# Patient Record
Sex: MALE | Race: WHITE | NOT HISPANIC OR LATINO | Employment: FULL TIME | URBAN - METROPOLITAN AREA
[De-identification: names, ages, dates, MRNs, and addresses within clinical notes are randomized per-mention and may not be internally consistent; named-entity substitution may affect disease eponyms.]

---

## 2019-02-26 PROBLEM — D03.9 LENTIGO MALIGNA: Status: ACTIVE | Noted: 2019-02-26

## 2019-03-20 ENCOUNTER — OFFICE VISIT (OUTPATIENT)
Dept: CARDIOLOGY | Facility: CLINIC | Age: 69
End: 2019-03-20
Payer: MEDICARE

## 2019-03-20 VITALS
BODY MASS INDEX: 31.26 KG/M2 | HEART RATE: 64 BPM | HEIGHT: 72 IN | SYSTOLIC BLOOD PRESSURE: 118 MMHG | WEIGHT: 230.81 LBS | DIASTOLIC BLOOD PRESSURE: 70 MMHG

## 2019-03-20 DIAGNOSIS — I65.23 BILATERAL CAROTID ARTERY STENOSIS: Chronic | ICD-10-CM

## 2019-03-20 DIAGNOSIS — E78.00 PURE HYPERCHOLESTEROLEMIA: ICD-10-CM

## 2019-03-20 DIAGNOSIS — I25.118 CORONARY ARTERY DISEASE OF NATIVE ARTERY OF NATIVE HEART WITH STABLE ANGINA PECTORIS: Primary | ICD-10-CM

## 2019-03-20 DIAGNOSIS — I10 ESSENTIAL HYPERTENSION: ICD-10-CM

## 2019-03-20 PROBLEM — I25.10 CORONARY ARTERY DISEASE INVOLVING NATIVE CORONARY ARTERY OF NATIVE HEART WITHOUT ANGINA PECTORIS: Chronic | Status: ACTIVE | Noted: 2019-03-20

## 2019-03-20 PROCEDURE — 99203 OFFICE O/P NEW LOW 30 MIN: CPT | Mod: S$GLB,,, | Performed by: INTERNAL MEDICINE

## 2019-03-20 PROCEDURE — 99203 PR OFFICE/OUTPT VISIT, NEW, LEVL III, 30-44 MIN: ICD-10-PCS | Mod: S$GLB,,, | Performed by: INTERNAL MEDICINE

## 2019-03-20 PROCEDURE — 99999 PR PBB SHADOW E&M-EST. PATIENT-LVL III: ICD-10-PCS | Mod: PBBFAC,,, | Performed by: INTERNAL MEDICINE

## 2019-03-20 PROCEDURE — 3074F PR MOST RECENT SYSTOLIC BLOOD PRESSURE < 130 MM HG: ICD-10-PCS | Mod: CPTII,S$GLB,, | Performed by: INTERNAL MEDICINE

## 2019-03-20 PROCEDURE — 99999 PR PBB SHADOW E&M-EST. PATIENT-LVL III: CPT | Mod: PBBFAC,,, | Performed by: INTERNAL MEDICINE

## 2019-03-20 PROCEDURE — 3078F PR MOST RECENT DIASTOLIC BLOOD PRESSURE < 80 MM HG: ICD-10-PCS | Mod: CPTII,S$GLB,, | Performed by: INTERNAL MEDICINE

## 2019-03-20 PROCEDURE — 3078F DIAST BP <80 MM HG: CPT | Mod: CPTII,S$GLB,, | Performed by: INTERNAL MEDICINE

## 2019-03-20 PROCEDURE — 3074F SYST BP LT 130 MM HG: CPT | Mod: CPTII,S$GLB,, | Performed by: INTERNAL MEDICINE

## 2019-03-20 RX ORDER — ROSUVASTATIN CALCIUM 10 MG/1
10 TABLET, COATED ORAL DAILY
Qty: 30 TABLET | Refills: 5 | Status: SHIPPED | OUTPATIENT
Start: 2019-03-20 | End: 2019-08-26 | Stop reason: SDUPTHER

## 2019-03-20 NOTE — PROGRESS NOTES
"Subjective:    Patient ID:  Jeffrey Schoen is a 68 y.o. male who presents for follow-up of Coronary Artery Disease; Hypertension; and Hyperlipidemia      HPI     Jeffrey Schoen returns for follow-up(its been 9 years since last seen) and is feeling well today.  He states he has gained weight over the years.  He states he takes a morning walk with his wife for 2 miles 3-5x/week.  He noticed a few episodes of substernal chest "pressure" with walking sometimes associated with dyspnea that is relived with rest.  He states it started in the last 3-6 months, but has not happened in the last 2 months at all despite walking as before.  Denies palpitations, dizziness or syncope.      Review of Systems   Constitution: Positive for weight gain. Negative for decreased appetite, diaphoresis, fever, weakness, malaise/fatigue and weight loss.   Eyes: Negative for visual disturbance.   Cardiovascular: Negative for chest pain, claudication, dyspnea on exertion, irregular heartbeat, near-syncope, orthopnea and palpitations.   Respiratory: Negative for cough, shortness of breath and wheezing.    Skin: Negative for rash.   Musculoskeletal: Negative for muscle cramps and myalgias.   Gastrointestinal: Negative for anorexia, heartburn and melena.   Neurological: Negative for dizziness and light-headedness.   Psychiatric/Behavioral: Negative for altered mental status.        Objective:    Physical Exam   Constitutional: He is oriented to person, place, and time. He appears well-developed and well-nourished.   /70   Pulse 64   Ht 6' (1.829 m)   Wt 104.7 kg (230 lb 13.2 oz)   BMI 31.31 kg/m²    HENT:   Head: Normocephalic and atraumatic.   Eyes: EOM are normal. Pupils are equal, round, and reactive to light.   Neck: Normal range of motion. Neck supple. No thyromegaly present.   Cardiovascular: Normal rate, regular rhythm, normal heart sounds and intact distal pulses. Exam reveals no gallop and no friction rub.   No murmur " heard.  Pulmonary/Chest: Effort normal and breath sounds normal. No respiratory distress. He has no wheezes. He has no rales.   Abdominal: Soft. Bowel sounds are normal. He exhibits no distension and no mass. There is no tenderness.   Musculoskeletal: Normal range of motion. He exhibits no edema.   Neurological: He is alert and oriented to person, place, and time. He has normal reflexes. Coordination normal.   Skin: Skin is warm and dry. No rash noted.   Psychiatric: He has a normal mood and affect. His behavior is normal.         Assessment:       1. Coronary artery disease of native artery of native heart with stable angina pectoris    2. Essential hypertension    3. Pure hypercholesterolemia    4. Bilateral carotid artery stenosis         Plan:       PET stress  CFD echo, carotid US  Change to Crestor 10 mg (d/c pravachol)  Lipids, Chem-20 in 2 months  Limit heavy activities  Weight loss; Med. Diet

## 2019-04-09 ENCOUNTER — TELEPHONE (OUTPATIENT)
Dept: CARDIOLOGY | Facility: CLINIC | Age: 69
End: 2019-04-09

## 2019-04-10 ENCOUNTER — CLINICAL SUPPORT (OUTPATIENT)
Dept: CARDIOLOGY | Facility: CLINIC | Age: 69
End: 2019-04-10
Attending: INTERNAL MEDICINE
Payer: MEDICARE

## 2019-04-10 ENCOUNTER — HOSPITAL ENCOUNTER (OUTPATIENT)
Dept: CARDIOLOGY | Facility: CLINIC | Age: 69
Discharge: HOME | End: 2019-04-10
Attending: INTERNAL MEDICINE
Payer: MEDICARE

## 2019-04-10 VITALS
HEART RATE: 70 BPM | HEIGHT: 72 IN | BODY MASS INDEX: 31.15 KG/M2 | SYSTOLIC BLOOD PRESSURE: 118 MMHG | DIASTOLIC BLOOD PRESSURE: 70 MMHG | WEIGHT: 230 LBS

## 2019-04-10 DIAGNOSIS — I25.118 CORONARY ARTERY DISEASE OF NATIVE ARTERY OF NATIVE HEART WITH STABLE ANGINA PECTORIS: ICD-10-CM

## 2019-04-10 DIAGNOSIS — I65.23 BILATERAL CAROTID ARTERY STENOSIS: ICD-10-CM

## 2019-04-10 LAB
ASCENDING AORTA: 3.95 CM
AV INDEX (PROSTH): 0.67
AV MEAN GRADIENT: 2.54 MMHG
AV PEAK GRADIENT: 4.58 MMHG
AV VALVE AREA: 2.87 CM2
AV VELOCITY RATIO: 0.56
BSA FOR ECHO PROCEDURE: 2.3 M2
CV ECHO LV RWT: 0.32 CM
CV STRESS BASE HR: 55 BPM
DIASTOLIC BLOOD PRESSURE: 77 MMHG
DOP CALC AO PEAK VEL: 1.07 M/S
DOP CALC AO VTI: 22.3 CM
DOP CALC LVOT AREA: 4.3 CM2
DOP CALC LVOT DIAMETER: 2.34 CM
DOP CALC LVOT PEAK VEL: 0.6 M/S
DOP CALC LVOT STROKE VOLUME: 64 CM3
DOP CALCLVOT PEAK VEL VTI: 14.89 CM
E WAVE DECELERATION TIME: 277.27 MSEC
E/A RATIO: 0.75
E/E' RATIO: 5.54
ECHO LV POSTERIOR WALL: 0.84 CM (ref 0.6–1.1)
END DIASTOLIC INDEX-HIGH: 170 ML/M2
END SYSTOLIC INDEX-HIGH: 70 ML/M2
FRACTIONAL SHORTENING: 27 % (ref 28–44)
INTERVENTRICULAR SEPTUM: 0.94 CM (ref 0.6–1.1)
LA MAJOR: 5.56 CM
LA MINOR: 5.2 CM
LA WIDTH: 3.72 CM
LEFT ATRIUM SIZE: 4 CM
LEFT ATRIUM VOLUME INDEX: 30.1 ML/M2
LEFT ATRIUM VOLUME: 67.97 CM3
LEFT CBA DIAS: 17 CM/S
LEFT CBA SYS: 65 CM/S
LEFT CCA DIST DIAS: 21 CM/S
LEFT CCA DIST SYS: 78 CM/S
LEFT CCA MID DIAS: 23 CM/S
LEFT CCA MID SYS: 100 CM/S
LEFT CCA PROX DIAS: 20 CM/S
LEFT CCA PROX SYS: 111 CM/S
LEFT ECA DIAS: 10 CM/S
LEFT ECA SYS: 85 CM/S
LEFT ICA DIST DIAS: 19 CM/S
LEFT ICA DIST SYS: 57 CM/S
LEFT ICA MID DIAS: 19 CM/S
LEFT ICA MID SYS: 59 CM/S
LEFT ICA PROX DIAS: 16 CM/S
LEFT ICA PROX SYS: 48 CM/S
LEFT INTERNAL DIMENSION IN SYSTOLE: 3.89 CM (ref 2.1–4)
LEFT VENTRICLE DIASTOLIC VOLUME INDEX: 60.26 ML/M2
LEFT VENTRICLE DIASTOLIC VOLUME: 136.24 ML
LEFT VENTRICLE MASS INDEX: 76.6 G/M2
LEFT VENTRICLE SYSTOLIC VOLUME INDEX: 29 ML/M2
LEFT VENTRICLE SYSTOLIC VOLUME: 65.51 ML
LEFT VENTRICULAR INTERNAL DIMENSION IN DIASTOLE: 5.32 CM (ref 3.5–6)
LEFT VENTRICULAR MASS: 173.11 G
LEFT VERTEBRAL DIAS: 14 CM/S
LEFT VERTEBRAL SYS: 48 CM/S
LV LATERAL E/E' RATIO: 5.14
LV SEPTAL E/E' RATIO: 6
MV PEAK A VEL: 0.48 M/S
MV PEAK E VEL: 0.36 M/S
NUC REST DIASTOLIC VOLUME INDEX: 73
NUC REST EJECTION FRACTION: 57
NUC REST SYSTOLIC VOLUME INDEX: 31
NUC STRESS DIASTOLIC VOLUME INDEX: 65
NUC STRESS EJECTION FRACTION: 53 %
NUC STRESS SYSTOLIC VOLUME INDEX: 30
OHS CV CAROTID RIGHT ICA EDV HIGHEST: 30
OHS CV CAROTID ULTRASOUND LEFT ICA/CCA RATIO: 0.53
OHS CV CAROTID ULTRASOUND RIGHT ICA/CCA RATIO: 0.87
OHS CV CPX 1 MINUTE RECOVERY HEART RATE: 98 BPM
OHS CV CPX 85 PERCENT MAX PREDICTED HEART RATE MALE: 131
OHS CV CPX MAX PREDICTED HEART RATE: 154
OHS CV CPX PATIENT IS FEMALE: 0
OHS CV CPX PATIENT IS MALE: 1
OHS CV CPX PEAK DIASTOLIC BLOOD PRESSURE: 62 MMHG
OHS CV CPX PEAK HEAR RATE: 63 BPM
OHS CV CPX PEAK RATE PRESSURE PRODUCT: 6552
OHS CV CPX PEAK SYSTOLIC BLOOD PRESSURE: 104 MMHG
OHS CV CPX PERCENT MAX PREDICTED HEART RATE ACHIEVED: 41
OHS CV CPX RATE PRESSURE PRODUCT PRESENTING: 6160
OHS CV PV CAROTID LEFT HIGHEST CCA: 111
OHS CV PV CAROTID LEFT HIGHEST ICA: 59
OHS CV PV CAROTID RIGHT HIGHEST CCA: 83
OHS CV PV CAROTID RIGHT HIGHEST ICA: 72
OHS CV US CAROTID LEFT HIGHEST EDV: 19
PISA TR MAX VEL: 2.32 M/S
PULM VEIN S/D RATIO: 0.91
PV PEAK D VEL: 0.47 M/S
PV PEAK S VEL: 0.43 M/S
RA MAJOR: 5.24 CM
RA PRESSURE: 3 MMHG
RA WIDTH: 4.86 CM
RETIRED EF AND QEF - SEE NOTES: 51 %
RIGHT ARM DIASTOLIC BLOOD PRESSURE: 70 MMHG
RIGHT ARM SYSTOLIC BLOOD PRESSURE: 118 MMHG
RIGHT CBA DIAS: 17 CM/S
RIGHT CBA SYS: 57 CM/S
RIGHT CCA DIST DIAS: 21 CM/S
RIGHT CCA DIST SYS: 77 CM/S
RIGHT CCA MID DIAS: 16 CM/S
RIGHT CCA MID SYS: 65 CM/S
RIGHT CCA PROX DIAS: 15 CM/S
RIGHT CCA PROX SYS: 83 CM/S
RIGHT ECA DIAS: 12 CM/S
RIGHT ECA SYS: 90 CM/S
RIGHT ICA DIST DIAS: 30 CM/S
RIGHT ICA DIST SYS: 72 CM/S
RIGHT ICA MID DIAS: 25 CM/S
RIGHT ICA MID SYS: 71 CM/S
RIGHT ICA PROX DIAS: 19 CM/S
RIGHT ICA PROX SYS: 55 CM/S
RIGHT VENTRICULAR END-DIASTOLIC DIMENSION: 4.71 CM
RIGHT VERTEBRAL DIAS: 12 CM/S
RIGHT VERTEBRAL SYS: 45 CM/S
RV TISSUE DOPPLER FREE WALL SYSTOLIC VELOCITY 1 (APICAL 4 CHAMBER VIEW): 13.37 M/S
SINUS: 4.14 CM
STJ: 3.72 CM
SYSTOLIC BLOOD PRESSURE: 112 MMHG
TDI LATERAL: 0.07
TDI SEPTAL: 0.06
TDI: 0.07
TR MAX PG: 21.53 MMHG
TRICUSPID ANNULAR PLANE SYSTOLIC EXCURSION: 2.74 CM
TV REST PULMONARY ARTERY PRESSURE: 25 MMHG

## 2019-04-10 PROCEDURE — A9555 RB82 RUBIDIUM: HCPCS | Mod: S$GLB,,, | Performed by: INTERNAL MEDICINE

## 2019-04-10 PROCEDURE — 93015 PET STRESS (CUPID ONLY): ICD-10-PCS | Mod: S$GLB,,, | Performed by: INTERNAL MEDICINE

## 2019-04-10 PROCEDURE — A9555 PET STRESS (CUPID ONLY): ICD-10-PCS | Mod: S$GLB,,, | Performed by: INTERNAL MEDICINE

## 2019-04-10 PROCEDURE — 93306 TTE W/DOPPLER COMPLETE: CPT | Mod: S$GLB,,, | Performed by: INTERNAL MEDICINE

## 2019-04-10 PROCEDURE — 93306 TRANSTHORACIC ECHO (TTE) COMPLETE (CUPID ONLY): ICD-10-PCS | Mod: S$GLB,,, | Performed by: INTERNAL MEDICINE

## 2019-04-10 PROCEDURE — 93880 EXTRACRANIAL BILAT STUDY: CPT | Mod: S$GLB,,, | Performed by: INTERNAL MEDICINE

## 2019-04-10 PROCEDURE — 99999 PR PBB SHADOW E&M-EST. PATIENT-LVL I: ICD-10-PCS | Mod: PBBFAC,,,

## 2019-04-10 PROCEDURE — 93880 CV US DOPPLER CAROTID (CUPID ONLY): ICD-10-PCS | Mod: S$GLB,,, | Performed by: INTERNAL MEDICINE

## 2019-04-10 PROCEDURE — 93015 CV STRESS TEST SUPVJ I&R: CPT | Mod: S$GLB,,, | Performed by: INTERNAL MEDICINE

## 2019-04-10 PROCEDURE — 99999 PR PBB SHADOW E&M-EST. PATIENT-LVL I: CPT | Mod: PBBFAC,,,

## 2019-04-10 PROCEDURE — 78492 PET STRESS (CUPID ONLY): ICD-10-PCS | Mod: S$GLB,,, | Performed by: INTERNAL MEDICINE

## 2019-04-10 PROCEDURE — 78492 MYOCRD IMG PET MLT RST&STRS: CPT | Mod: S$GLB,,, | Performed by: INTERNAL MEDICINE

## 2019-04-10 RX ORDER — DIPYRIDAMOLE 5 MG/ML
40.73 INJECTION INTRAVENOUS
Status: COMPLETED | OUTPATIENT
Start: 2019-04-10 | End: 2019-04-10

## 2019-04-10 RX ADMIN — DIPYRIDAMOLE 40.75 MG: 5 INJECTION INTRAVENOUS at 08:04

## 2019-04-10 NOTE — NURSING NOTE
Patient identified via spelling of name and date of birth. Arrived ambulatory to clinic lobby with wife in attendance. NPO status verified. No caffeine intake verified. Instructions given. Verbalizes understanding. Consent signed.

## 2019-04-11 ENCOUNTER — TELEPHONE (OUTPATIENT)
Dept: CARDIOLOGY | Facility: CLINIC | Age: 69
End: 2019-04-11

## 2019-04-11 NOTE — TELEPHONE ENCOUNTER
Returned patient's call. He reviewed test results on [facility] and is satisfied. He has no questions.     Freya Garcia Staff   Caller: Pt called (Today,  8:22 AM)             Pt calling to get results for Echo,and US. Please call pt @ [phone]. Thank you.

## 2019-04-11 NOTE — TELEPHONE ENCOUNTER
----- Message from Freya Wheatley sent at 4/11/2019  8:22 AM CDT -----  Contact: Pt called   Pt calling to get results for Echo,and US. Please call pt @ [phone]. Thank you.

## 2019-05-20 ENCOUNTER — LAB VISIT (OUTPATIENT)
Dept: LAB | Facility: HOSPITAL | Age: 69
End: 2019-05-20
Attending: INTERNAL MEDICINE
Payer: MEDICARE

## 2019-05-20 ENCOUNTER — TELEPHONE (OUTPATIENT)
Dept: CARDIOLOGY | Facility: CLINIC | Age: 69
End: 2019-05-20

## 2019-05-20 ENCOUNTER — PATIENT MESSAGE (OUTPATIENT)
Dept: CARDIOLOGY | Facility: CLINIC | Age: 69
End: 2019-05-20

## 2019-05-20 DIAGNOSIS — I25.118 CORONARY ARTERY DISEASE OF NATIVE ARTERY OF NATIVE HEART WITH STABLE ANGINA PECTORIS: ICD-10-CM

## 2019-05-20 DIAGNOSIS — E78.00 PURE HYPERCHOLESTEROLEMIA: ICD-10-CM

## 2019-05-20 LAB
ALBUMIN SERPL BCP-MCNC: 3.8 G/DL (ref 3.5–5.2)
ALP SERPL-CCNC: 81 U/L (ref 55–135)
ALT SERPL W/O P-5'-P-CCNC: 26 U/L (ref 10–44)
ANION GAP SERPL CALC-SCNC: 8 MMOL/L (ref 8–16)
AST SERPL-CCNC: 27 U/L (ref 10–40)
BILIRUB SERPL-MCNC: 0.7 MG/DL (ref 0.1–1)
BUN SERPL-MCNC: 24 MG/DL (ref 8–23)
CALCIUM SERPL-MCNC: 9.5 MG/DL (ref 8.7–10.5)
CHLORIDE SERPL-SCNC: 108 MMOL/L (ref 95–110)
CHOLEST SERPL-MCNC: 134 MG/DL (ref 120–199)
CHOLEST/HDLC SERPL: 2.4 {RATIO} (ref 2–5)
CO2 SERPL-SCNC: 27 MMOL/L (ref 23–29)
CREAT SERPL-MCNC: 1.1 MG/DL (ref 0.5–1.4)
EST. GFR  (AFRICAN AMERICAN): >60 ML/MIN/1.73 M^2
EST. GFR  (NON AFRICAN AMERICAN): >60 ML/MIN/1.73 M^2
GLUCOSE SERPL-MCNC: 95 MG/DL (ref 70–110)
HDLC SERPL-MCNC: 56 MG/DL (ref 40–75)
HDLC SERPL: 41.8 % (ref 20–50)
LDLC SERPL CALC-MCNC: 65.8 MG/DL (ref 63–159)
NONHDLC SERPL-MCNC: 78 MG/DL
POTASSIUM SERPL-SCNC: 3.8 MMOL/L (ref 3.5–5.1)
PROT SERPL-MCNC: 6.8 G/DL (ref 6–8.4)
SODIUM SERPL-SCNC: 143 MMOL/L (ref 136–145)
TRIGL SERPL-MCNC: 61 MG/DL (ref 30–150)

## 2019-05-20 PROCEDURE — 80061 LIPID PANEL: CPT

## 2019-05-20 PROCEDURE — 80053 COMPREHEN METABOLIC PANEL: CPT

## 2019-05-20 PROCEDURE — 36415 COLL VENOUS BLD VENIPUNCTURE: CPT | Mod: PO

## 2019-05-20 NOTE — TELEPHONE ENCOUNTER
Results of labs done today were given to patient by phone and through [facility].    Notes recorded by Jose Kessler MD on 5/20/2019 at 4:36 PM CDT  Cholesterol profile much improved and now in very good shape (stay on the Crestor).  All other tests look good.

## 2019-05-28 ENCOUNTER — TELEPHONE (OUTPATIENT)
Dept: CARDIOLOGY | Facility: CLINIC | Age: 69
End: 2019-05-28

## 2019-05-28 NOTE — TELEPHONE ENCOUNTER
----- Message from Devika Soto sent at 5/28/2019  8:52 AM CDT -----  Contact: Pt [phone]  Khushbu please call pt to scooby an appt     Thanks

## 2019-05-28 NOTE — TELEPHONE ENCOUNTER
Returned patient's call. He asked that we put him on the list to see Dr. Kessler at the end of Aug so he can get on the routine of coming every Aug. Will call him with a date when appt is made.    .   Devika Garcia Staff   Caller: Pt [phone] (Today,  8:52 AM)             Khushbu please call pt to scooby an appt     Thanks

## 2019-08-27 RX ORDER — ROSUVASTATIN CALCIUM 10 MG/1
10 TABLET, COATED ORAL DAILY
Qty: 30 TABLET | Refills: 6 | Status: SHIPPED | OUTPATIENT
Start: 2019-08-27 | End: 2020-08-18 | Stop reason: SDUPTHER

## 2020-08-19 RX ORDER — ROSUVASTATIN CALCIUM 10 MG/1
10 TABLET, COATED ORAL DAILY
Qty: 30 TABLET | Refills: 6 | Status: SHIPPED | OUTPATIENT
Start: 2020-08-19 | End: 2020-08-31

## 2020-08-31 PROBLEM — Z79.899 ON STATIN THERAPY: Status: ACTIVE | Noted: 2020-08-31

## 2020-08-31 PROBLEM — D03.9 LENTIGO MALIGNA: Status: RESOLVED | Noted: 2019-02-26 | Resolved: 2020-08-31

## 2020-08-31 PROBLEM — Z79.82 LONG TERM (CURRENT) USE OF ASPIRIN: Status: ACTIVE | Noted: 2020-08-31

## 2020-08-31 PROBLEM — N20.0 NEPHROLITHIASIS: Status: ACTIVE | Noted: 2020-08-31

## 2020-08-31 PROBLEM — N40.0 BENIGN PROSTATIC HYPERPLASIA WITHOUT LOWER URINARY TRACT SYMPTOMS: Status: ACTIVE | Noted: 2020-08-31

## 2021-01-22 ENCOUNTER — PATIENT MESSAGE (OUTPATIENT)
Dept: ADMINISTRATIVE | Facility: OTHER | Age: 71
End: 2021-01-22

## 2021-05-16 ENCOUNTER — TELEPHONE (OUTPATIENT)
Dept: PAIN MEDICINE | Facility: CLINIC | Age: 71
End: 2021-05-16

## 2021-05-24 ENCOUNTER — OFFICE VISIT (OUTPATIENT)
Dept: PAIN MEDICINE | Facility: CLINIC | Age: 71
End: 2021-05-24
Payer: MEDICARE

## 2021-05-24 VITALS
DIASTOLIC BLOOD PRESSURE: 63 MMHG | HEART RATE: 71 BPM | TEMPERATURE: 97 F | SYSTOLIC BLOOD PRESSURE: 113 MMHG | HEIGHT: 71 IN | WEIGHT: 225.19 LBS | OXYGEN SATURATION: 96 % | BODY MASS INDEX: 31.52 KG/M2 | RESPIRATION RATE: 18 BRPM

## 2021-05-24 DIAGNOSIS — M51.36 DDD (DEGENERATIVE DISC DISEASE), LUMBAR: ICD-10-CM

## 2021-05-24 DIAGNOSIS — M54.16 RIGHT LUMBAR RADICULOPATHY: Primary | ICD-10-CM

## 2021-05-24 DIAGNOSIS — M54.16 BILATERAL LUMBAR RADICULOPATHY: ICD-10-CM

## 2021-05-24 DIAGNOSIS — M47.816 LUMBAR SPONDYLOSIS: ICD-10-CM

## 2021-05-24 PROCEDURE — 99999 PR PBB SHADOW E&M-EST. PATIENT-LVL V: CPT | Mod: PBBFAC,,, | Performed by: ANESTHESIOLOGY

## 2021-05-24 PROCEDURE — 1101F PT FALLS ASSESS-DOCD LE1/YR: CPT | Mod: CPTII,S$GLB,, | Performed by: ANESTHESIOLOGY

## 2021-05-24 PROCEDURE — 1159F MED LIST DOCD IN RCRD: CPT | Mod: S$GLB,,, | Performed by: ANESTHESIOLOGY

## 2021-05-24 PROCEDURE — 1125F PR PAIN SEVERITY QUANTIFIED, PAIN PRESENT: ICD-10-PCS | Mod: S$GLB,,, | Performed by: ANESTHESIOLOGY

## 2021-05-24 PROCEDURE — 1125F AMNT PAIN NOTED PAIN PRSNT: CPT | Mod: S$GLB,,, | Performed by: ANESTHESIOLOGY

## 2021-05-24 PROCEDURE — 99204 OFFICE O/P NEW MOD 45 MIN: CPT | Mod: S$GLB,,, | Performed by: ANESTHESIOLOGY

## 2021-05-24 PROCEDURE — 1159F PR MEDICATION LIST DOCUMENTED IN MEDICAL RECORD: ICD-10-PCS | Mod: S$GLB,,, | Performed by: ANESTHESIOLOGY

## 2021-05-24 PROCEDURE — 3288F PR FALLS RISK ASSESSMENT DOCUMENTED: ICD-10-PCS | Mod: CPTII,S$GLB,, | Performed by: ANESTHESIOLOGY

## 2021-05-24 PROCEDURE — 99999 PR PBB SHADOW E&M-EST. PATIENT-LVL V: ICD-10-PCS | Mod: PBBFAC,,, | Performed by: ANESTHESIOLOGY

## 2021-05-24 PROCEDURE — 3008F PR BODY MASS INDEX (BMI) DOCUMENTED: ICD-10-PCS | Mod: CPTII,S$GLB,, | Performed by: ANESTHESIOLOGY

## 2021-05-24 PROCEDURE — 3288F FALL RISK ASSESSMENT DOCD: CPT | Mod: CPTII,S$GLB,, | Performed by: ANESTHESIOLOGY

## 2021-05-24 PROCEDURE — 1101F PR PT FALLS ASSESS DOC 0-1 FALLS W/OUT INJ PAST YR: ICD-10-PCS | Mod: CPTII,S$GLB,, | Performed by: ANESTHESIOLOGY

## 2021-05-24 PROCEDURE — 3008F BODY MASS INDEX DOCD: CPT | Mod: CPTII,S$GLB,, | Performed by: ANESTHESIOLOGY

## 2021-05-24 PROCEDURE — 99204 PR OFFICE/OUTPT VISIT, NEW, LEVL IV, 45-59 MIN: ICD-10-PCS | Mod: S$GLB,,, | Performed by: ANESTHESIOLOGY

## 2021-07-21 ENCOUNTER — OFFICE VISIT (OUTPATIENT)
Dept: PAIN MEDICINE | Facility: CLINIC | Age: 71
End: 2021-07-21
Payer: MEDICARE

## 2021-07-21 VITALS
TEMPERATURE: 97 F | HEART RATE: 64 BPM | SYSTOLIC BLOOD PRESSURE: 107 MMHG | BODY MASS INDEX: 31.32 KG/M2 | WEIGHT: 224.56 LBS | RESPIRATION RATE: 18 BRPM | OXYGEN SATURATION: 95 % | DIASTOLIC BLOOD PRESSURE: 59 MMHG

## 2021-07-21 DIAGNOSIS — M47.816 LUMBAR SPONDYLOSIS: ICD-10-CM

## 2021-07-21 DIAGNOSIS — M54.16 RIGHT LUMBAR RADICULOPATHY: Primary | ICD-10-CM

## 2021-07-21 DIAGNOSIS — M51.36 DDD (DEGENERATIVE DISC DISEASE), LUMBAR: ICD-10-CM

## 2021-07-21 PROCEDURE — 99999 PR PBB SHADOW E&M-EST. PATIENT-LVL IV: ICD-10-PCS | Mod: PBBFAC,,, | Performed by: ANESTHESIOLOGY

## 2021-07-21 PROCEDURE — 1159F MED LIST DOCD IN RCRD: CPT | Mod: CPTII,S$GLB,, | Performed by: ANESTHESIOLOGY

## 2021-07-21 PROCEDURE — 99999 PR PBB SHADOW E&M-EST. PATIENT-LVL IV: CPT | Mod: PBBFAC,,, | Performed by: ANESTHESIOLOGY

## 2021-07-21 PROCEDURE — 3078F DIAST BP <80 MM HG: CPT | Mod: CPTII,S$GLB,, | Performed by: ANESTHESIOLOGY

## 2021-07-21 PROCEDURE — 3074F SYST BP LT 130 MM HG: CPT | Mod: CPTII,S$GLB,, | Performed by: ANESTHESIOLOGY

## 2021-07-21 PROCEDURE — 3078F PR MOST RECENT DIASTOLIC BLOOD PRESSURE < 80 MM HG: ICD-10-PCS | Mod: CPTII,S$GLB,, | Performed by: ANESTHESIOLOGY

## 2021-07-21 PROCEDURE — 3008F BODY MASS INDEX DOCD: CPT | Mod: CPTII,S$GLB,, | Performed by: ANESTHESIOLOGY

## 2021-07-21 PROCEDURE — 3074F PR MOST RECENT SYSTOLIC BLOOD PRESSURE < 130 MM HG: ICD-10-PCS | Mod: CPTII,S$GLB,, | Performed by: ANESTHESIOLOGY

## 2021-07-21 PROCEDURE — 3288F PR FALLS RISK ASSESSMENT DOCUMENTED: ICD-10-PCS | Mod: CPTII,S$GLB,, | Performed by: ANESTHESIOLOGY

## 2021-07-21 PROCEDURE — 1125F PR PAIN SEVERITY QUANTIFIED, PAIN PRESENT: ICD-10-PCS | Mod: CPTII,S$GLB,, | Performed by: ANESTHESIOLOGY

## 2021-07-21 PROCEDURE — 1101F PT FALLS ASSESS-DOCD LE1/YR: CPT | Mod: CPTII,S$GLB,, | Performed by: ANESTHESIOLOGY

## 2021-07-21 PROCEDURE — 1125F AMNT PAIN NOTED PAIN PRSNT: CPT | Mod: CPTII,S$GLB,, | Performed by: ANESTHESIOLOGY

## 2021-07-21 PROCEDURE — 99213 OFFICE O/P EST LOW 20 MIN: CPT | Mod: S$GLB,,, | Performed by: ANESTHESIOLOGY

## 2021-07-21 PROCEDURE — 1101F PR PT FALLS ASSESS DOC 0-1 FALLS W/OUT INJ PAST YR: ICD-10-PCS | Mod: CPTII,S$GLB,, | Performed by: ANESTHESIOLOGY

## 2021-07-21 PROCEDURE — 99213 PR OFFICE/OUTPT VISIT, EST, LEVL III, 20-29 MIN: ICD-10-PCS | Mod: S$GLB,,, | Performed by: ANESTHESIOLOGY

## 2021-07-21 PROCEDURE — 3288F FALL RISK ASSESSMENT DOCD: CPT | Mod: CPTII,S$GLB,, | Performed by: ANESTHESIOLOGY

## 2021-07-21 PROCEDURE — 1159F PR MEDICATION LIST DOCUMENTED IN MEDICAL RECORD: ICD-10-PCS | Mod: CPTII,S$GLB,, | Performed by: ANESTHESIOLOGY

## 2021-07-21 PROCEDURE — 3008F PR BODY MASS INDEX (BMI) DOCUMENTED: ICD-10-PCS | Mod: CPTII,S$GLB,, | Performed by: ANESTHESIOLOGY

## 2021-10-11 ENCOUNTER — PATIENT MESSAGE (OUTPATIENT)
Dept: PAIN MEDICINE | Facility: CLINIC | Age: 71
End: 2021-10-11

## 2021-10-13 ENCOUNTER — OFFICE VISIT (OUTPATIENT)
Dept: PAIN MEDICINE | Facility: CLINIC | Age: 71
End: 2021-10-13
Payer: MEDICARE

## 2021-10-13 ENCOUNTER — TELEPHONE (OUTPATIENT)
Dept: PAIN MEDICINE | Facility: CLINIC | Age: 71
End: 2021-10-13

## 2021-10-13 DIAGNOSIS — Z01.818 PRE-OP TESTING: ICD-10-CM

## 2021-10-13 DIAGNOSIS — M47.816 LUMBAR SPONDYLOSIS: ICD-10-CM

## 2021-10-13 DIAGNOSIS — M54.16 LUMBAR RADICULOPATHY: Primary | ICD-10-CM

## 2021-10-13 DIAGNOSIS — M48.061 SPINAL STENOSIS OF LUMBAR REGION WITHOUT NEUROGENIC CLAUDICATION: ICD-10-CM

## 2021-10-13 PROCEDURE — 99213 PR OFFICE/OUTPT VISIT, EST, LEVL III, 20-29 MIN: ICD-10-PCS | Mod: 95,,, | Performed by: ANESTHESIOLOGY

## 2021-10-13 PROCEDURE — 99213 OFFICE O/P EST LOW 20 MIN: CPT | Mod: 95,,, | Performed by: ANESTHESIOLOGY

## 2021-10-13 RX ORDER — ALPRAZOLAM 0.5 MG/1
1 TABLET, ORALLY DISINTEGRATING ORAL ONCE AS NEEDED
Status: CANCELLED | OUTPATIENT
Start: 2021-10-29 | End: 2033-03-26

## 2021-10-27 DIAGNOSIS — M54.16 LUMBAR RADICULOPATHY: Primary | ICD-10-CM

## 2021-10-29 ENCOUNTER — HOSPITAL ENCOUNTER (OUTPATIENT)
Facility: HOSPITAL | Age: 71
Discharge: HOME | End: 2021-10-29
Attending: ANESTHESIOLOGY | Admitting: ANESTHESIOLOGY
Payer: MEDICARE

## 2021-10-29 ENCOUNTER — PATIENT MESSAGE (OUTPATIENT)
Dept: SURGERY | Facility: HOSPITAL | Age: 71
End: 2021-10-29
Payer: MEDICARE

## 2021-10-29 ENCOUNTER — HOSPITAL ENCOUNTER (OUTPATIENT)
Dept: RADIOLOGY | Facility: HOSPITAL | Age: 71
Discharge: HOME | End: 2021-10-29
Attending: ANESTHESIOLOGY
Payer: MEDICARE

## 2021-10-29 DIAGNOSIS — M54.16 LUMBAR RADICULOPATHY: ICD-10-CM

## 2021-10-29 PROCEDURE — 25500020 PHARM REV CODE 255: Mod: PO | Performed by: ANESTHESIOLOGY

## 2021-10-29 PROCEDURE — 62323 PR INJ LUMBAR/SACRAL, W/IMAGING GUIDANCE: ICD-10-PCS | Mod: ,,, | Performed by: ANESTHESIOLOGY

## 2021-10-29 PROCEDURE — 62323 NJX INTERLAMINAR LMBR/SAC: CPT | Mod: ,,, | Performed by: ANESTHESIOLOGY

## 2021-10-29 PROCEDURE — 63600175 PHARM REV CODE 636 W HCPCS: Mod: PO | Performed by: ANESTHESIOLOGY

## 2021-10-29 PROCEDURE — A4216 STERILE WATER/SALINE, 10 ML: HCPCS | Mod: PO | Performed by: ANESTHESIOLOGY

## 2021-10-29 PROCEDURE — 62323 NJX INTERLAMINAR LMBR/SAC: CPT | Mod: PO | Performed by: ANESTHESIOLOGY

## 2021-10-29 PROCEDURE — 25000003 PHARM REV CODE 250: Mod: PO | Performed by: ANESTHESIOLOGY

## 2021-10-29 PROCEDURE — 76000 FLUOROSCOPY <1 HR PHYS/QHP: CPT | Mod: TC,PO

## 2021-10-29 RX ORDER — ALPRAZOLAM 0.5 MG/1
1 TABLET, ORALLY DISINTEGRATING ORAL ONCE AS NEEDED
Status: COMPLETED | OUTPATIENT
Start: 2021-10-29 | End: 2021-10-29

## 2021-10-29 RX ORDER — SODIUM CHLORIDE 9 MG/ML
INJECTION, SOLUTION INTRAMUSCULAR; INTRAVENOUS; SUBCUTANEOUS
Status: DISCONTINUED | OUTPATIENT
Start: 2021-10-29 | End: 2021-10-29 | Stop reason: HOSPADM

## 2021-10-29 RX ORDER — METHYLPREDNISOLONE ACETATE 80 MG/ML
INJECTION, SUSPENSION INTRA-ARTICULAR; INTRALESIONAL; INTRAMUSCULAR; SOFT TISSUE
Status: DISCONTINUED | OUTPATIENT
Start: 2021-10-29 | End: 2021-10-29 | Stop reason: HOSPADM

## 2021-10-29 RX ORDER — LIDOCAINE HYDROCHLORIDE 10 MG/ML
INJECTION, SOLUTION EPIDURAL; INFILTRATION; INTRACAUDAL; PERINEURAL
Status: DISCONTINUED | OUTPATIENT
Start: 2021-10-29 | End: 2021-10-29 | Stop reason: HOSPADM

## 2021-10-29 RX ADMIN — ALPRAZOLAM 1 MG: 0.5 TABLET, ORALLY DISINTEGRATING ORAL at 01:10

## 2021-11-01 VITALS
HEIGHT: 71 IN | SYSTOLIC BLOOD PRESSURE: 129 MMHG | OXYGEN SATURATION: 95 % | TEMPERATURE: 98 F | HEART RATE: 68 BPM | RESPIRATION RATE: 16 BRPM | DIASTOLIC BLOOD PRESSURE: 69 MMHG | WEIGHT: 225 LBS | BODY MASS INDEX: 31.5 KG/M2

## 2021-11-19 ENCOUNTER — OFFICE VISIT (OUTPATIENT)
Dept: PAIN MEDICINE | Facility: CLINIC | Age: 71
End: 2021-11-19
Payer: MEDICARE

## 2021-11-19 VITALS
DIASTOLIC BLOOD PRESSURE: 60 MMHG | TEMPERATURE: 97 F | OXYGEN SATURATION: 95 % | SYSTOLIC BLOOD PRESSURE: 108 MMHG | HEART RATE: 78 BPM | RESPIRATION RATE: 18 BRPM | BODY MASS INDEX: 30.86 KG/M2 | WEIGHT: 221.25 LBS

## 2021-11-19 DIAGNOSIS — M54.16 LUMBAR RADICULOPATHY: Primary | ICD-10-CM

## 2021-11-19 DIAGNOSIS — M47.816 LUMBAR SPONDYLOSIS: ICD-10-CM

## 2021-11-19 DIAGNOSIS — M48.061 SPINAL STENOSIS OF LUMBAR REGION WITHOUT NEUROGENIC CLAUDICATION: ICD-10-CM

## 2021-11-19 PROCEDURE — 1101F PT FALLS ASSESS-DOCD LE1/YR: CPT | Mod: CPTII,S$GLB,, | Performed by: PHYSICIAN ASSISTANT

## 2021-11-19 PROCEDURE — 1160F RVW MEDS BY RX/DR IN RCRD: CPT | Mod: CPTII,S$GLB,, | Performed by: PHYSICIAN ASSISTANT

## 2021-11-19 PROCEDURE — 99214 OFFICE O/P EST MOD 30 MIN: CPT | Mod: S$GLB,,, | Performed by: PHYSICIAN ASSISTANT

## 2021-11-19 PROCEDURE — 3074F PR MOST RECENT SYSTOLIC BLOOD PRESSURE < 130 MM HG: ICD-10-PCS | Mod: CPTII,S$GLB,, | Performed by: PHYSICIAN ASSISTANT

## 2021-11-19 PROCEDURE — 1159F PR MEDICATION LIST DOCUMENTED IN MEDICAL RECORD: ICD-10-PCS | Mod: CPTII,S$GLB,, | Performed by: PHYSICIAN ASSISTANT

## 2021-11-19 PROCEDURE — 3078F DIAST BP <80 MM HG: CPT | Mod: CPTII,S$GLB,, | Performed by: PHYSICIAN ASSISTANT

## 2021-11-19 PROCEDURE — 3008F BODY MASS INDEX DOCD: CPT | Mod: CPTII,S$GLB,, | Performed by: PHYSICIAN ASSISTANT

## 2021-11-19 PROCEDURE — 1125F PR PAIN SEVERITY QUANTIFIED, PAIN PRESENT: ICD-10-PCS | Mod: CPTII,S$GLB,, | Performed by: PHYSICIAN ASSISTANT

## 2021-11-19 PROCEDURE — 1101F PR PT FALLS ASSESS DOC 0-1 FALLS W/OUT INJ PAST YR: ICD-10-PCS | Mod: CPTII,S$GLB,, | Performed by: PHYSICIAN ASSISTANT

## 2021-11-19 PROCEDURE — 99999 PR PBB SHADOW E&M-EST. PATIENT-LVL V: CPT | Mod: PBBFAC,,, | Performed by: PHYSICIAN ASSISTANT

## 2021-11-19 PROCEDURE — 1125F AMNT PAIN NOTED PAIN PRSNT: CPT | Mod: CPTII,S$GLB,, | Performed by: PHYSICIAN ASSISTANT

## 2021-11-19 PROCEDURE — 3288F PR FALLS RISK ASSESSMENT DOCUMENTED: ICD-10-PCS | Mod: CPTII,S$GLB,, | Performed by: PHYSICIAN ASSISTANT

## 2021-11-19 PROCEDURE — 3288F FALL RISK ASSESSMENT DOCD: CPT | Mod: CPTII,S$GLB,, | Performed by: PHYSICIAN ASSISTANT

## 2021-11-19 PROCEDURE — 99214 PR OFFICE/OUTPT VISIT, EST, LEVL IV, 30-39 MIN: ICD-10-PCS | Mod: S$GLB,,, | Performed by: PHYSICIAN ASSISTANT

## 2021-11-19 PROCEDURE — 3008F PR BODY MASS INDEX (BMI) DOCUMENTED: ICD-10-PCS | Mod: CPTII,S$GLB,, | Performed by: PHYSICIAN ASSISTANT

## 2021-11-19 PROCEDURE — 1159F MED LIST DOCD IN RCRD: CPT | Mod: CPTII,S$GLB,, | Performed by: PHYSICIAN ASSISTANT

## 2021-11-19 PROCEDURE — 3078F PR MOST RECENT DIASTOLIC BLOOD PRESSURE < 80 MM HG: ICD-10-PCS | Mod: CPTII,S$GLB,, | Performed by: PHYSICIAN ASSISTANT

## 2021-11-19 PROCEDURE — 1160F PR REVIEW ALL MEDS BY PRESCRIBER/CLIN PHARMACIST DOCUMENTED: ICD-10-PCS | Mod: CPTII,S$GLB,, | Performed by: PHYSICIAN ASSISTANT

## 2021-11-19 PROCEDURE — 99999 PR PBB SHADOW E&M-EST. PATIENT-LVL V: ICD-10-PCS | Mod: PBBFAC,,, | Performed by: PHYSICIAN ASSISTANT

## 2021-11-19 PROCEDURE — 3074F SYST BP LT 130 MM HG: CPT | Mod: CPTII,S$GLB,, | Performed by: PHYSICIAN ASSISTANT

## 2021-11-19 RX ORDER — ALPRAZOLAM 0.5 MG/1
1 TABLET, ORALLY DISINTEGRATING ORAL ONCE AS NEEDED
Status: CANCELLED | OUTPATIENT
Start: 2022-01-14 | End: 2033-06-11

## 2022-01-11 ENCOUNTER — LAB VISIT (OUTPATIENT)
Dept: FAMILY MEDICINE | Facility: CLINIC | Age: 72
End: 2022-01-11
Payer: MEDICARE

## 2022-01-11 DIAGNOSIS — Z01.818 PRE-OP TESTING: ICD-10-CM

## 2022-01-11 PROCEDURE — U0003 INFECTIOUS AGENT DETECTION BY NUCLEIC ACID (DNA OR RNA); SEVERE ACUTE RESPIRATORY SYNDROME CORONAVIRUS 2 (SARS-COV-2) (CORONAVIRUS DISEASE [COVID-19]), AMPLIFIED PROBE TECHNIQUE, MAKING USE OF HIGH THROUGHPUT TECHNOLOGIES AS DESCRIBED BY CMS-2020-01-R: HCPCS | Performed by: ANESTHESIOLOGY

## 2022-01-11 PROCEDURE — U0005 INFEC AGEN DETEC AMPLI PROBE: HCPCS | Performed by: ANESTHESIOLOGY

## 2022-01-12 LAB
SARS-COV-2 RNA RESP QL NAA+PROBE: NOT DETECTED
SARS-COV-2- CYCLE NUMBER: NORMAL

## 2022-01-14 ENCOUNTER — HOSPITAL ENCOUNTER (OUTPATIENT)
Facility: HOSPITAL | Age: 72
Discharge: HOME | End: 2022-01-14
Attending: ANESTHESIOLOGY | Admitting: ANESTHESIOLOGY
Payer: MEDICARE

## 2022-01-14 ENCOUNTER — HOSPITAL ENCOUNTER (OUTPATIENT)
Dept: RADIOLOGY | Facility: HOSPITAL | Age: 72
Discharge: HOME | End: 2022-01-14
Attending: ANESTHESIOLOGY
Payer: MEDICARE

## 2022-01-14 VITALS
OXYGEN SATURATION: 99 % | TEMPERATURE: 97 F | WEIGHT: 225 LBS | SYSTOLIC BLOOD PRESSURE: 126 MMHG | BODY MASS INDEX: 30.48 KG/M2 | DIASTOLIC BLOOD PRESSURE: 71 MMHG | HEIGHT: 72 IN | HEART RATE: 68 BPM | RESPIRATION RATE: 16 BRPM

## 2022-01-14 DIAGNOSIS — M54.50 LOWER BACK PAIN: ICD-10-CM

## 2022-01-14 DIAGNOSIS — M54.16 LUMBAR RADICULOPATHY: ICD-10-CM

## 2022-01-14 PROCEDURE — 62323 NJX INTERLAMINAR LMBR/SAC: CPT | Mod: PO | Performed by: ANESTHESIOLOGY

## 2022-01-14 PROCEDURE — 62323 PR INJ LUMBAR/SACRAL, W/IMAGING GUIDANCE: ICD-10-PCS | Mod: ,,, | Performed by: ANESTHESIOLOGY

## 2022-01-14 PROCEDURE — 62323 NJX INTERLAMINAR LMBR/SAC: CPT | Mod: ,,, | Performed by: ANESTHESIOLOGY

## 2022-01-14 PROCEDURE — 25000003 PHARM REV CODE 250: Mod: PO | Performed by: ANESTHESIOLOGY

## 2022-01-14 PROCEDURE — 63600175 PHARM REV CODE 636 W HCPCS: Mod: PO | Performed by: ANESTHESIOLOGY

## 2022-01-14 PROCEDURE — 25500020 PHARM REV CODE 255: Mod: PO | Performed by: ANESTHESIOLOGY

## 2022-01-14 PROCEDURE — 76000 FLUOROSCOPY <1 HR PHYS/QHP: CPT | Mod: TC,PO

## 2022-01-14 RX ORDER — ALPRAZOLAM 0.5 MG/1
1 TABLET, ORALLY DISINTEGRATING ORAL ONCE AS NEEDED
Status: COMPLETED | OUTPATIENT
Start: 2022-01-14 | End: 2022-01-14

## 2022-01-14 RX ORDER — LIDOCAINE HYDROCHLORIDE 10 MG/ML
INJECTION, SOLUTION EPIDURAL; INFILTRATION; INTRACAUDAL; PERINEURAL
Status: DISCONTINUED | OUTPATIENT
Start: 2022-01-14 | End: 2022-01-14 | Stop reason: HOSPADM

## 2022-01-14 RX ORDER — METHYLPREDNISOLONE ACETATE 80 MG/ML
INJECTION, SUSPENSION INTRA-ARTICULAR; INTRALESIONAL; INTRAMUSCULAR; SOFT TISSUE
Status: DISCONTINUED | OUTPATIENT
Start: 2022-01-14 | End: 2022-01-14 | Stop reason: HOSPADM

## 2022-01-14 RX ADMIN — ALPRAZOLAM 1 MG: 0.5 TABLET, ORALLY DISINTEGRATING ORAL at 02:01

## 2022-01-14 NOTE — OP NOTE

## 2022-01-14 NOTE — DISCHARGE SUMMARY
[facility] - Surgery  Discharge Note  Short Stay    Procedure(s) (LRB):  Injection-steroid-epidural-lumbar L5/S1 to Right (N/A)    OUTCOME: Patient tolerated treatment/procedure well without complication and is now ready for discharge.    DISPOSITION: Home or Self Care    FINAL DIAGNOSIS:  Lumbar radiculopathy    FOLLOWUP: In clinic    DISCHARGE INSTRUCTIONS:    Discharge Procedure Orders   Diet Adult Regular     Notify your health care provider if you experience any of the following:  temperature >100.4     No dressing needed     Activity as tolerated

## 2022-01-14 NOTE — H&P
CC: Back pain    HPI: The patient is a 70yo man with a history of lumbar radiculopathy here for L5/S1 GIANNA. There are no major changes in history and physical from 10/13/21.    Past Medical History:   Diagnosis Date    Coronary artery disease involving native coronary artery of native heart without angina pectoris 3/20/2019    Left heart cath 9/10/2007: 30% mid-LAD, 60% ostial D1, LCX with 40% mid, RCA with 30% mid-stenosis, 100% PLB.    Diverticulosis, sigmoid     MILD     Gout     Hyperlipidemia     Hypertension     Hypothyroidism     Lentigo maligna 2/26/2019    Polyp of ascending colon 01/22/2019    (.5 CM)      Polyp of cecum 01/22/2019    Polyp of descending colon 01/22/2019    (2)   0.5 CM POLYP AND (1 )  0.8CM POLYP    Polyp of transverse colon 01/22/2019    Polyp, sigmoid colon     (2) 0.5 CM POLYP       Past Surgical History:   Procedure Laterality Date    ANGIOGRAM, CORONARY, WITH LEFT HEART CATHETERIZATION      COLONOSCOPY  01/22/2019    repeat will be determined by biopsy results of polyps removed     EPIDURAL STEROID INJECTION INTO LUMBAR SPINE N/A 10/29/2021    Procedure: Injection-steroid-epidural-lumbar L5/S1;  Surgeon: Frank Pressley MD;  Location: [facility] OR;  Service: Pain Management;  Laterality: N/A;    TONSILLECTOMY         Family History   Problem Relation Age of Onset    Cancer Mother         breast    Breast cancer Mother     Heart disease Father         stroke, bypass    Stroke Father        Social History     Socioeconomic History    Marital status:    Tobacco Use    Smoking status: Never Smoker    Smokeless tobacco: Never Used   Substance and Sexual Activity    Alcohol use: Yes     Alcohol/week: 1.0 standard drink     Types: 1 Shots of liquor per week    Drug use: Never    Sexual activity: Yes     Partners: Female     Birth control/protection: None     Social Determinants of Health     Financial Resource Strain: Low Risk     Difficulty of Paying Living  Expenses: Not hard at all   Food Insecurity: No Food Insecurity    Worried About Running Out of Food in the Last Year: Never true    Ran Out of Food in the Last Year: Never true   Transportation Needs: No Transportation Needs    Lack of Transportation (Medical): No    Lack of Transportation (Non-Medical): No   Physical Activity: Insufficiently Active    Days of Exercise per Week: 4 days    Minutes of Exercise per Session: 30 min   Stress: No Stress Concern Present    Feeling of Stress : Not at all   Social Connections: Unknown    Frequency of Communication with Friends and Family: More than three times a week    Frequency of Social Gatherings with Friends and Family: More than three times a week    Active Member of Clubs or Organizations: Yes    Attends Club or Organization Meetings: More than 4 times per year    Marital Status:        No current facility-administered medications for this encounter.       Review of patient's allergies indicates:   Allergen Reactions    Achromycin      Skin redness      Rosuvastatin      arthralgia       Vitals:    01/12/22 1644 01/14/22 1425   BP:  116/67   Pulse:  61   Resp:  17   Temp:  97.2 °F (36.2 °C)   TempSrc:  Skin   SpO2:  97%   Weight: 102.1 kg (225 lb)    Height: 6' (1.829 m)        ASA 2, Mallampati 2    REVIEW OF SYSTEMS:     GENERAL: No weight loss, malaise or fevers.  HEENT:  No recent changes in vision or hearing  NECK: Negative for lumps, no difficulty with swallowing.  RESPIRATORY: Negative for cough, wheezing or shortness of breath, patient denies any recent URI.  CARDIOVASCULAR: Negative for chest pain, leg swelling or palpitations.  GI: Negative for abdominal discomfort, blood in stools or black stools or change in bowel habits.  MUSCULOSKELETAL: See HPI.  SKIN: Negative for lesions, rash, and itching.  PSYCH: No suicidal or homicidal ideations, no current mood disturbances.  HEMATOLOGY/LYMPHOLOGY: Negative for prolonged bleeding, bruising  easily or swollen nodes. Patient is not currently taking any anti-coagulants  ENDO: No history of diabetes or thyroid dysfunction  NEURO: No history of syncope, paralysis, seizures or tremors.All other reviewed and negative other than HPI.    Physical exam:  Gen: A and O x3, pleasant, well-groomed  Skin: No rashes or obvious lesions  HEENT: PERRLA, no obvious deformities on ears or in canals. No thyroid masses, trachea midline, no palpable lymph nodes in neck, axilla.  CVS: Regular rate and rhythm, normal S1 and S2, no murmurs.  Resp: Clear to auscultation bilaterally.  Abdomen: Soft, NT/ND, normal bowel sounds present.  Musculoskeletal/Neuro: Moving all extremities    Assessment:  Lumbar radiculopathy  -     Case Request Operating Room: Injection-steroid-epidural-lumbar L5/S1 to Right  -     Place in Outpatient; Standing  -     Diet NPO; Standing  -     alprazolam ODT dissolvable tablet 1 mg  -     Notify physician ; Standing  -     Notify physician ; Standing  -     Notify physician (specify); Standing  -     Verify informed consent; Standing  -     Vital signs; Standing    Other orders  -     IP VTE LOW RISK PATIENT; Standing

## 2022-01-14 NOTE — PLAN OF CARE
Pt resting comfortably. Pt denies pain. Pt denies complaints. Pt tolerating PO fluids. Pt meets meets criteria for d/c home.

## 2022-02-08 ENCOUNTER — OFFICE VISIT (OUTPATIENT)
Dept: OTOLARYNGOLOGY | Facility: CLINIC | Age: 72
End: 2022-02-08
Payer: MEDICARE

## 2022-02-08 ENCOUNTER — CLINICAL SUPPORT (OUTPATIENT)
Dept: AUDIOLOGY | Facility: CLINIC | Age: 72
End: 2022-02-08
Payer: MEDICARE

## 2022-02-08 VITALS — BODY MASS INDEX: 31.16 KG/M2 | WEIGHT: 229.75 LBS

## 2022-02-08 DIAGNOSIS — H90.3 ASNHL (ASYMMETRICAL SENSORINEURAL HEARING LOSS): Primary | ICD-10-CM

## 2022-02-08 DIAGNOSIS — H90.3 ASYMMETRICAL SENSORINEURAL HEARING LOSS: Primary | ICD-10-CM

## 2022-02-08 DIAGNOSIS — H61.21 IMPACTED CERUMEN OF RIGHT EAR: ICD-10-CM

## 2022-02-08 PROCEDURE — 3288F FALL RISK ASSESSMENT DOCD: CPT | Mod: CPTII,S$GLB,, | Performed by: OTOLARYNGOLOGY

## 2022-02-08 PROCEDURE — 92567 PR TYMPA2METRY: ICD-10-PCS | Mod: S$GLB,,, | Performed by: AUDIOLOGIST-HEARING AID FITTER

## 2022-02-08 PROCEDURE — 92557 PR COMPREHENSIVE HEARING TEST: ICD-10-PCS | Mod: S$GLB,,, | Performed by: AUDIOLOGIST-HEARING AID FITTER

## 2022-02-08 PROCEDURE — 1101F PR PT FALLS ASSESS DOC 0-1 FALLS W/OUT INJ PAST YR: ICD-10-PCS | Mod: CPTII,S$GLB,, | Performed by: OTOLARYNGOLOGY

## 2022-02-08 PROCEDURE — 92567 TYMPANOMETRY: CPT | Mod: S$GLB,,, | Performed by: AUDIOLOGIST-HEARING AID FITTER

## 2022-02-08 PROCEDURE — 92557 COMPREHENSIVE HEARING TEST: CPT | Mod: S$GLB,,, | Performed by: AUDIOLOGIST-HEARING AID FITTER

## 2022-02-08 PROCEDURE — 99203 PR OFFICE/OUTPT VISIT, NEW, LEVL III, 30-44 MIN: ICD-10-PCS | Mod: 25,S$GLB,, | Performed by: OTOLARYNGOLOGY

## 2022-02-08 PROCEDURE — 1101F PT FALLS ASSESS-DOCD LE1/YR: CPT | Mod: CPTII,S$GLB,, | Performed by: OTOLARYNGOLOGY

## 2022-02-08 PROCEDURE — 1126F AMNT PAIN NOTED NONE PRSNT: CPT | Mod: CPTII,S$GLB,, | Performed by: OTOLARYNGOLOGY

## 2022-02-08 PROCEDURE — 3008F PR BODY MASS INDEX (BMI) DOCUMENTED: ICD-10-PCS | Mod: CPTII,S$GLB,, | Performed by: OTOLARYNGOLOGY

## 2022-02-08 PROCEDURE — 3288F PR FALLS RISK ASSESSMENT DOCUMENTED: ICD-10-PCS | Mod: CPTII,S$GLB,, | Performed by: OTOLARYNGOLOGY

## 2022-02-08 PROCEDURE — 3008F BODY MASS INDEX DOCD: CPT | Mod: CPTII,S$GLB,, | Performed by: OTOLARYNGOLOGY

## 2022-02-08 PROCEDURE — 1126F PR PAIN SEVERITY QUANTIFIED, NO PAIN PRESENT: ICD-10-PCS | Mod: CPTII,S$GLB,, | Performed by: OTOLARYNGOLOGY

## 2022-02-08 PROCEDURE — 99203 OFFICE O/P NEW LOW 30 MIN: CPT | Mod: 25,S$GLB,, | Performed by: OTOLARYNGOLOGY

## 2022-02-08 PROCEDURE — 99999 PR PBB SHADOW E&M-EST. PATIENT-LVL III: ICD-10-PCS | Mod: PBBFAC,,, | Performed by: OTOLARYNGOLOGY

## 2022-02-08 PROCEDURE — 69210 REMOVE IMPACTED EAR WAX UNI: CPT | Mod: S$GLB,,, | Performed by: OTOLARYNGOLOGY

## 2022-02-08 PROCEDURE — 99999 PR PBB SHADOW E&M-EST. PATIENT-LVL III: CPT | Mod: PBBFAC,,, | Performed by: OTOLARYNGOLOGY

## 2022-02-08 PROCEDURE — 69210 PR REMOVAL IMPACTED CERUMEN REQUIRING INSTRUMENTATION, UNILATERAL: ICD-10-PCS | Mod: S$GLB,,, | Performed by: OTOLARYNGOLOGY

## 2022-02-08 NOTE — PROGRESS NOTES
Subjective:       Patient ID: Jeffrey Schoen is a 71 y.o. male.    Chief Complaint: Hearing Loss (In left ear) and Cerumen Impaction    Sherif is here for hearing loss.   Length of symptoms: years.  He is an  and feels that over time he has noted progressive left hearing loss. This affects him at dinner and at work, when clients or his wife sit on his left side.    no ear surgeries. No pain. No otorrhea. No fam HL. No tinnitus.         Patient validated questionnaires (if applicable):      %       No flowsheet data found.  No flowsheet data found.  No flowsheet data found.         Social History     Tobacco Use   Smoking Status Never Smoker   Smokeless Tobacco Never Used     Social History     Substance and Sexual Activity   Alcohol Use Yes    Alcohol/week: 1.0 standard drink    Types: 1 Shots of liquor per week          Objective:        Constitutional:   Vital signs are normal. He appears well-developed and well-nourished.     Head:  Normocephalic and atraumatic.     Neck:  Neck normal without thyromegaly masses, asymmetry, normal tracheal structure, crepitus, and tenderness.     Pulmonary/Chest:   Effort and breath sounds normal.         Tests / Results:  Procedure: bilateral microscopy with removal of cerumen  Reason: cerumen impaction, inability to visualize the tympanic membrane  Details: microscope used to obtain view of ear canals bilaterally cerumen removed with the use curette, suction, and alligator forceps.  Lidocaine and H2O2 was used on the left.  Findings: AD: small amt cerumen (dry and thick) against TM, AS: large amt of thick and dry cerumen packed in anterior sulcus adj to TM  Patient tolerated procedure well.            Assessment:       1. Asymmetrical sensorineural hearing loss    2. Impacted cerumen of right ear          Plan:         Discussed audiogram with pt.  Can consider imaging though threshold not significant at this time. We discussed pros and cons. Will repeat audio in 1  yr, sooner prn  HA candidate

## 2022-02-08 NOTE — PROGRESS NOTES
Jeffrey Schoen was seen 02/08/2022 for an audiological evaluation. Pertinent complaints today include hearing loss. Pt denies loud noise exposure, family Hx of HL and tinnitus. He says he has difficulty understanding conversation, especially in the presence of background noise.     Results reveal a mild-to-moderately severe sensorineural hearing loss for the right ear, and  mild-to-severe sensorineural hearing loss for the left ear.    Speech Reception Thresholds were  25 dBHL for the right ear and 30 dBHL for the left ear.    Word recognition scores were excellent for the right ear and good for the left ear.   Tympanograms were Type A for the right ear and Type A for the left ear.    Audiogram results were reviewed in detail with patient and all questions were answered. Results will be reviewed by ENT at the completion of this note. Recommend binaural amplification pending medical clearance, repeat hearing testing in one year and bilateral hearing protection with either muffs or in-ear protection in loud noises. He scheduled a HA consult on 2/24 at 12:30.

## 2022-02-15 ENCOUNTER — OFFICE VISIT (OUTPATIENT)
Dept: PAIN MEDICINE | Facility: CLINIC | Age: 72
End: 2022-02-15
Payer: MEDICARE

## 2022-02-15 VITALS
SYSTOLIC BLOOD PRESSURE: 112 MMHG | WEIGHT: 226.63 LBS | DIASTOLIC BLOOD PRESSURE: 60 MMHG | HEIGHT: 72 IN | BODY MASS INDEX: 30.7 KG/M2 | HEART RATE: 74 BPM

## 2022-02-15 DIAGNOSIS — Z01.818 PRE-OP TESTING: ICD-10-CM

## 2022-02-15 DIAGNOSIS — M54.16 LUMBAR RADICULOPATHY: Primary | ICD-10-CM

## 2022-02-15 DIAGNOSIS — M51.36 DDD (DEGENERATIVE DISC DISEASE), LUMBAR: ICD-10-CM

## 2022-02-15 DIAGNOSIS — M48.061 SPINAL STENOSIS OF LUMBAR REGION WITHOUT NEUROGENIC CLAUDICATION: ICD-10-CM

## 2022-02-15 PROCEDURE — 3008F BODY MASS INDEX DOCD: CPT | Mod: CPTII,S$GLB,, | Performed by: PHYSICIAN ASSISTANT

## 2022-02-15 PROCEDURE — 3288F PR FALLS RISK ASSESSMENT DOCUMENTED: ICD-10-PCS | Mod: CPTII,S$GLB,, | Performed by: PHYSICIAN ASSISTANT

## 2022-02-15 PROCEDURE — 1160F PR REVIEW ALL MEDS BY PRESCRIBER/CLIN PHARMACIST DOCUMENTED: ICD-10-PCS | Mod: CPTII,S$GLB,, | Performed by: PHYSICIAN ASSISTANT

## 2022-02-15 PROCEDURE — 3074F PR MOST RECENT SYSTOLIC BLOOD PRESSURE < 130 MM HG: ICD-10-PCS | Mod: CPTII,S$GLB,, | Performed by: PHYSICIAN ASSISTANT

## 2022-02-15 PROCEDURE — 99999 PR PBB SHADOW E&M-EST. PATIENT-LVL IV: ICD-10-PCS | Mod: PBBFAC,,, | Performed by: PHYSICIAN ASSISTANT

## 2022-02-15 PROCEDURE — 3008F PR BODY MASS INDEX (BMI) DOCUMENTED: ICD-10-PCS | Mod: CPTII,S$GLB,, | Performed by: PHYSICIAN ASSISTANT

## 2022-02-15 PROCEDURE — 3078F DIAST BP <80 MM HG: CPT | Mod: CPTII,S$GLB,, | Performed by: PHYSICIAN ASSISTANT

## 2022-02-15 PROCEDURE — 3078F PR MOST RECENT DIASTOLIC BLOOD PRESSURE < 80 MM HG: ICD-10-PCS | Mod: CPTII,S$GLB,, | Performed by: PHYSICIAN ASSISTANT

## 2022-02-15 PROCEDURE — 1159F PR MEDICATION LIST DOCUMENTED IN MEDICAL RECORD: ICD-10-PCS | Mod: CPTII,S$GLB,, | Performed by: PHYSICIAN ASSISTANT

## 2022-02-15 PROCEDURE — 1125F PR PAIN SEVERITY QUANTIFIED, PAIN PRESENT: ICD-10-PCS | Mod: CPTII,S$GLB,, | Performed by: PHYSICIAN ASSISTANT

## 2022-02-15 PROCEDURE — 99214 OFFICE O/P EST MOD 30 MIN: CPT | Mod: CS,S$GLB,, | Performed by: PHYSICIAN ASSISTANT

## 2022-02-15 PROCEDURE — 1159F MED LIST DOCD IN RCRD: CPT | Mod: CPTII,S$GLB,, | Performed by: PHYSICIAN ASSISTANT

## 2022-02-15 PROCEDURE — 1125F AMNT PAIN NOTED PAIN PRSNT: CPT | Mod: CPTII,S$GLB,, | Performed by: PHYSICIAN ASSISTANT

## 2022-02-15 PROCEDURE — 1101F PT FALLS ASSESS-DOCD LE1/YR: CPT | Mod: CPTII,S$GLB,, | Performed by: PHYSICIAN ASSISTANT

## 2022-02-15 PROCEDURE — 99999 PR PBB SHADOW E&M-EST. PATIENT-LVL IV: CPT | Mod: PBBFAC,,, | Performed by: PHYSICIAN ASSISTANT

## 2022-02-15 PROCEDURE — 1160F RVW MEDS BY RX/DR IN RCRD: CPT | Mod: CPTII,S$GLB,, | Performed by: PHYSICIAN ASSISTANT

## 2022-02-15 PROCEDURE — 1101F PR PT FALLS ASSESS DOC 0-1 FALLS W/OUT INJ PAST YR: ICD-10-PCS | Mod: CPTII,S$GLB,, | Performed by: PHYSICIAN ASSISTANT

## 2022-02-15 PROCEDURE — 3288F FALL RISK ASSESSMENT DOCD: CPT | Mod: CPTII,S$GLB,, | Performed by: PHYSICIAN ASSISTANT

## 2022-02-15 PROCEDURE — 3074F SYST BP LT 130 MM HG: CPT | Mod: CPTII,S$GLB,, | Performed by: PHYSICIAN ASSISTANT

## 2022-02-15 PROCEDURE — 99214 PR OFFICE/OUTPT VISIT, EST, LEVL IV, 30-39 MIN: ICD-10-PCS | Mod: CS,S$GLB,, | Performed by: PHYSICIAN ASSISTANT

## 2022-02-15 RX ORDER — ALPRAZOLAM 0.5 MG/1
1 TABLET, ORALLY DISINTEGRATING ORAL ONCE AS NEEDED
Status: CANCELLED | OUTPATIENT
Start: 2022-03-11 | End: 2033-08-06

## 2022-02-17 NOTE — PROGRESS NOTES
This note was completed with dictation software and grammatical errors may exist.    CC:  Right leg pain    HPI:  The patient is a 71-year-old man with a history of CAD on aspirin, nephrolithiasis, hypothyroidism who presents in referral from Dr. Mcneill for back and leg pain.  He is status post L5/S1 interlaminar epidural steroid injection to the right on 01/14/2022 with 100% relief lasting about 2 weeks, now reporting 65-70% relief.  He feels pain in the right posterolateral thigh that feels like a toothache.  It is worse with prolonged standing mainly at the end of the day.  He is able to toe walk with his wife and dog about 2 miles every morning without severe pain.  He denies weakness or numbness, denies bladder or bowel incontinence.      Previous history:  The patient reports that about 6 months ago he began having right posterior thigh pain.  He states that it was not severe at 1st but has been worsening.  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  At 1 time he was also having bilateral foot numbness but this has resolved.  He states that the pain is worse the longer he has been sitting and when he goes to stand up.  He denies any pain with walking and in fact he walks 2 miles every morning and feels better with this.  He states that it is particularly worse with prolonged sitting such as when driving.  He denies any pain in the back or in his left leg.  He denies any weakness.    Pain intervention history:     He is status post L5/S1 interlaminar epidural steroid injection on 10/29/2021 with 50% relief.   He is status post L5/S1 interlaminar epidural steroid injection to the right on 01/14/2022 with 100% relief lasting about 2 weeks, now reporting 65-70% relief.      Spine surgeries:  None    Antineuropathics:  NSAIDs:  Physical therapy:  He has done physical  therapy for over a month at [facility]  Antidepressants:  Muscle relaxers:  Opioids:  Antiplatelets/Anticoagulants:  Aspirin 325    ROS:  He reports leg pain only.  Balance of review of systems is negative.    No results found for: LABA1C, HGBA1C    Lab Results   Component Value Date    WBC 5.58 09/10/2021    HGB 13.5 (L) 09/10/2021    HCT 41.8 09/10/2021    MCV 92 09/10/2021     09/10/2021             Past Medical History:   Diagnosis Date    Coronary artery disease involving native coronary artery of native heart without angina pectoris 3/20/2019    Left heart cath 9/10/2007: 30% mid-LAD, 60% ostial D1, LCX with 40% mid, RCA with 30% mid-stenosis, 100% PLB.    Diverticulosis, sigmoid     MILD     Gout     Hyperlipidemia     Hypertension     Hypothyroidism     Lentigo maligna 2/26/2019    Polyp of ascending colon 01/22/2019    (.5 CM)      Polyp of cecum 01/22/2019    Polyp of descending colon 01/22/2019    (2)   0.5 CM POLYP AND (1 )  0.8CM POLYP    Polyp of transverse colon 01/22/2019    Polyp, sigmoid colon     (2) 0.5 CM POLYP       Past Surgical History:   Procedure Laterality Date    ANGIOGRAM, CORONARY, WITH LEFT HEART CATHETERIZATION      COLONOSCOPY  01/22/2019    repeat will be determined by biopsy results of polyps removed     EPIDURAL STEROID INJECTION INTO LUMBAR SPINE N/A 10/29/2021    Procedure: Injection-steroid-epidural-lumbar L5/S1;  Surgeon: Frank Pressley MD;  Location: [facility] OR;  Service: Pain Management;  Laterality: N/A;    EPIDURAL STEROID INJECTION INTO LUMBAR SPINE N/A 1/14/2022    Procedure: Injection-steroid-epidural-lumbar L5/S1 to Right;  Surgeon: Frank Pressley MD;  Location: [facility] OR;  Service: Pain Management;  Laterality: N/A;    TONSILLECTOMY         Social History     Socioeconomic History    Marital status:    Tobacco Use    Smoking status: Never Smoker    Smokeless tobacco: Never Used   Substance and Sexual Activity    Alcohol use: Yes      Alcohol/week: 1.0 standard drink     Types: 1 Shots of liquor per week    Drug use: Never    Sexual activity: Yes     Partners: Female     Birth control/protection: None     Social Determinants of Health     Financial Resource Strain: Low Risk     Difficulty of Paying Living Expenses: Not hard at all   Food Insecurity: No Food Insecurity    Worried About Running Out of Food in the Last Year: Never true    Ran Out of Food in the Last Year: Never true   Transportation Needs: No Transportation Needs    Lack of Transportation (Medical): No    Lack of Transportation (Non-Medical): No   Physical Activity: Insufficiently Active    Days of Exercise per Week: 4 days    Minutes of Exercise per Session: 30 min   Stress: No Stress Concern Present    Feeling of Stress : Not at all   Social Connections: Unknown    Frequency of Communication with Friends and Family: More than three times a week    Frequency of Social Gatherings with Friends and Family: More than three times a week    Active Member of Clubs or Organizations: Yes    Attends Club or Organization Meetings: More than 4 times per year    Marital Status:          Medications/Allergies: See med card    Vitals:    02/15/22 0822   BP: 112/60   Pulse: 74   Weight: 102.8 kg (226 lb 10.1 oz)   Height: 6' (1.829 m)   PainSc:   7         Physical exam:  Gen: A and O x3, pleasant, well-groomed  Skin: No rashes or obvious lesions  HEENT: PERRLA, no obvious deformities on ears or in canals. Trachea midline.  CVS: Regular rate and rhythm, normal palpable pulses.  Resp:No increased work of breathing, symmetrical chest rise.  Abdomen: Soft, NT/ND.  Musculoskeletal: Able to heel walk, toe walk. No antalgic gait.     Neuro:  Lower extremities: 5/5 strength bilaterally  Reflexes: Patellar 2+, Achilles 2+ bilaterally.  Sensory:  Intact and symmetrical to light touch and pinprick in L2-S1 dermatomes bilaterally.    Lumbar spine:  Lumbar spine: ROM is full with  flexion extension and oblique extension with no increased pain.    Ang's test causes no increased pain on either side.    Supine straight leg raise is negative bilaterally.    Internal and external rotation of the hip causes no increased pain on either side.  Myofascial exam: No tenderness to palpation across lumbar paraspinous muscles.      Imagin21 MRI L-spine:  The conus ends at T12-L1.  No diffuse abnormal marrow or central conus signal is appreciated. No paraspinal fluid collections are appreciated. Osseous alignment appears maintained. There is some straightening of the curvature overall.  This may be positional versus muscular spasm.  There appears to be some incidental slight superior bladder wall thickening although may be exaggerated with underdistention.  There is some subcentimeter renal cystic averaging demonstrated.  There is decreased disc water signal overall throughout the lumbar spine with spurring and endplate degeneration.  There is posterior element, ligamentous hypertrophy with mid to lower lumbar distribution predominance.  No extruded  type fragment is currently appreciated.   L1-2 demonstrates broad-based concentric disc bulging with narrowing and annular fissuring.  There is mild lateral recess, inferior neural foraminal narrowing along with thecal sac triangulation.  L2-3 demonstrates broad-based concentric disc bulging with some thecal sac triangulation and annular fissuring.  There is mild to moderate neural foraminal narrowing demonstrated bilaterally.  There appears to be some lateral protrusion appearance more so on the right.   L3-4 demonstrates broad-based concentric disc bulging with thecal sac triangulation along with moderate neural foraminal narrowing bilaterally.   L4-5 demonstrates concentric disc bulging with thecal sac triangulation, narrowing and annular fissuring.  There is moderate to significant neural foraminal narrowing demonstrated  bilaterally.   L5-S1 demonstrates broad-based concentric disc bulging with annular fissuring and thecal sac triangulation.  There is moderate to significant neural foraminal narrowing demonstrated bilaterally right slightly more so than left with suspected exiting nerve root contact.      Assessment:   The patient is a 71-year-old man with a history of CAD on aspirin, nephrolithiasis, hypothyroidism who presents in referral from Dr. Mcneill for back and leg pain.    1. Lumbar radiculopathy  Case Request Operating Room: Injection-steroid-epidural-lumbar L5/S1 to Right   2. Spinal stenosis of lumbar region without neurogenic claudication     3. DDD (degenerative disc disease), lumbar     4. Pre-op testing  COVID-19 Routine Screening       Plan:  1. The patient had additional relief following the 2nd injection and we discussed a 3rd.  He would like to have another injection to see if he can get closer to full relief.  I will schedule him for an L5/S1 interlaminar epidural steroid injection to the right.  2. Follow-up in 4 weeks postprocedure or sooner as needed.

## 2022-02-17 NOTE — H&P (VIEW-ONLY)
This note was completed with dictation software and grammatical errors may exist.    CC:  Right leg pain    HPI:  The patient is a 71-year-old man with a history of CAD on aspirin, nephrolithiasis, hypothyroidism who presents in referral from Dr. Mcneill for back and leg pain.  He is status post L5/S1 interlaminar epidural steroid injection to the right on 01/14/2022 with 100% relief lasting about 2 weeks, now reporting 65-70% relief.  He feels pain in the right posterolateral thigh that feels like a toothache.  It is worse with prolonged standing mainly at the end of the day.  He is able to toe walk with his wife and dog about 2 miles every morning without severe pain.  He denies weakness or numbness, denies bladder or bowel incontinence.      Previous history:  The patient reports that about 6 months ago he began having right posterior thigh pain.  He states that it was not severe at 1st but has been worsening.  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  At 1 time he was also having bilateral foot numbness but this has resolved.  He states that the pain is worse the longer he has been sitting and when he goes to stand up.  He denies any pain with walking and in fact he walks 2 miles every morning and feels better with this.  He states that it is particularly worse with prolonged sitting such as when driving.  He denies any pain in the back or in his left leg.  He denies any weakness.    Pain intervention history:     He is status post L5/S1 interlaminar epidural steroid injection on 10/29/2021 with 50% relief.   He is status post L5/S1 interlaminar epidural steroid injection to the right on 01/14/2022 with 100% relief lasting about 2 weeks, now reporting 65-70% relief.      Spine surgeries:  None    Antineuropathics:  NSAIDs:  Physical therapy:  He has done physical  therapy for over a month at [facility]  Antidepressants:  Muscle relaxers:  Opioids:  Antiplatelets/Anticoagulants:  Aspirin 325    ROS:  He reports leg pain only.  Balance of review of systems is negative.    No results found for: LABA1C, HGBA1C    Lab Results   Component Value Date    WBC 5.58 09/10/2021    HGB 13.5 (L) 09/10/2021    HCT 41.8 09/10/2021    MCV 92 09/10/2021     09/10/2021             Past Medical History:   Diagnosis Date    Coronary artery disease involving native coronary artery of native heart without angina pectoris 3/20/2019    Left heart cath 9/10/2007: 30% mid-LAD, 60% ostial D1, LCX with 40% mid, RCA with 30% mid-stenosis, 100% PLB.    Diverticulosis, sigmoid     MILD     Gout     Hyperlipidemia     Hypertension     Hypothyroidism     Lentigo maligna 2/26/2019    Polyp of ascending colon 01/22/2019    (.5 CM)      Polyp of cecum 01/22/2019    Polyp of descending colon 01/22/2019    (2)   0.5 CM POLYP AND (1 )  0.8CM POLYP    Polyp of transverse colon 01/22/2019    Polyp, sigmoid colon     (2) 0.5 CM POLYP       Past Surgical History:   Procedure Laterality Date    ANGIOGRAM, CORONARY, WITH LEFT HEART CATHETERIZATION      COLONOSCOPY  01/22/2019    repeat will be determined by biopsy results of polyps removed     EPIDURAL STEROID INJECTION INTO LUMBAR SPINE N/A 10/29/2021    Procedure: Injection-steroid-epidural-lumbar L5/S1;  Surgeon: Frank Pressley MD;  [facility] OR;  Service: Pain Management;  Laterality: N/A;    EPIDURAL STEROID INJECTION INTO LUMBAR SPINE N/A 1/14/2022    Procedure: Injection-steroid-epidural-lumbar L5/S1 to Right;  Surgeon: Frank Pressley MD;  Location: [facility] OR;  Service: Pain Management;  Laterality: N/A;    TONSILLECTOMY         Social History     Socioeconomic History    Marital status:    Tobacco Use    Smoking status: Never Smoker    Smokeless tobacco: Never Used   Substance and Sexual Activity    Alcohol use: Yes      Alcohol/week: 1.0 standard drink     Types: 1 Shots of liquor per week    Drug use: Never    Sexual activity: Yes     Partners: Female     Birth control/protection: None     Social Determinants of Health     Financial Resource Strain: Low Risk     Difficulty of Paying Living Expenses: Not hard at all   Food Insecurity: No Food Insecurity    Worried About Running Out of Food in the Last Year: Never true    Ran Out of Food in the Last Year: Never true   Transportation Needs: No Transportation Needs    Lack of Transportation (Medical): No    Lack of Transportation (Non-Medical): No   Physical Activity: Insufficiently Active    Days of Exercise per Week: 4 days    Minutes of Exercise per Session: 30 min   Stress: No Stress Concern Present    Feeling of Stress : Not at all   Social Connections: Unknown    Frequency of Communication with Friends and Family: More than three times a week    Frequency of Social Gatherings with Friends and Family: More than three times a week    Active Member of Clubs or Organizations: Yes    Attends Club or Organization Meetings: More than 4 times per year    Marital Status:          Medications/Allergies: See med card    Vitals:    02/15/22 0822   BP: 112/60   Pulse: 74   Weight: 102.8 kg (226 lb 10.1 oz)   Height: 6' (1.829 m)   PainSc:   7         Physical exam:  Gen: A and O x3, pleasant, well-groomed  Skin: No rashes or obvious lesions  HEENT: PERRLA, no obvious deformities on ears or in canals. Trachea midline.  CVS: Regular rate and rhythm, normal palpable pulses.  Resp:No increased work of breathing, symmetrical chest rise.  Abdomen: Soft, NT/ND.  Musculoskeletal: Able to heel walk, toe walk. No antalgic gait.     Neuro:  Lower extremities: 5/5 strength bilaterally  Reflexes: Patellar 2+, Achilles 2+ bilaterally.  Sensory:  Intact and symmetrical to light touch and pinprick in L2-S1 dermatomes bilaterally.    Lumbar spine:  Lumbar spine: ROM is full with  flexion extension and oblique extension with no increased pain.    Ang's test causes no increased pain on either side.    Supine straight leg raise is negative bilaterally.    Internal and external rotation of the hip causes no increased pain on either side.  Myofascial exam: No tenderness to palpation across lumbar paraspinous muscles.      Imagin21 MRI L-spine:  The conus ends at T12-L1.  No diffuse abnormal marrow or central conus signal is appreciated. No paraspinal fluid collections are appreciated. Osseous alignment appears maintained. There is some straightening of the curvature overall.  This may be positional versus muscular spasm.  There appears to be some incidental slight superior bladder wall thickening although may be exaggerated with underdistention.  There is some subcentimeter renal cystic averaging demonstrated.  There is decreased disc water signal overall throughout the lumbar spine with spurring and endplate degeneration.  There is posterior element, ligamentous hypertrophy with mid to lower lumbar distribution predominance.  No extruded  type fragment is currently appreciated.   L1-2 demonstrates broad-based concentric disc bulging with narrowing and annular fissuring.  There is mild lateral recess, inferior neural foraminal narrowing along with thecal sac triangulation.  L2-3 demonstrates broad-based concentric disc bulging with some thecal sac triangulation and annular fissuring.  There is mild to moderate neural foraminal narrowing demonstrated bilaterally.  There appears to be some lateral protrusion appearance more so on the right.   L3-4 demonstrates broad-based concentric disc bulging with thecal sac triangulation along with moderate neural foraminal narrowing bilaterally.   L4-5 demonstrates concentric disc bulging with thecal sac triangulation, narrowing and annular fissuring.  There is moderate to significant neural foraminal narrowing demonstrated  bilaterally.   L5-S1 demonstrates broad-based concentric disc bulging with annular fissuring and thecal sac triangulation.  There is moderate to significant neural foraminal narrowing demonstrated bilaterally right slightly more so than left with suspected exiting nerve root contact.      Assessment:   The patient is a 71-year-old man with a history of CAD on aspirin, nephrolithiasis, hypothyroidism who presents in referral from Dr. Mcneill for back and leg pain.    1. Lumbar radiculopathy  Case Request Operating Room: Injection-steroid-epidural-lumbar L5/S1 to Right   2. Spinal stenosis of lumbar region without neurogenic claudication     3. DDD (degenerative disc disease), lumbar     4. Pre-op testing  COVID-19 Routine Screening       Plan:  1. The patient had additional relief following the 2nd injection and we discussed a 3rd.  He would like to have another injection to see if he can get closer to full relief.  I will schedule him for an L5/S1 interlaminar epidural steroid injection to the right.  2. Follow-up in 4 weeks postprocedure or sooner as needed.

## 2022-02-24 ENCOUNTER — CLINICAL SUPPORT (OUTPATIENT)
Dept: AUDIOLOGY | Facility: CLINIC | Age: 72
End: 2022-02-24
Payer: MEDICARE

## 2022-02-24 DIAGNOSIS — Z71.89 HEARING AID CONSULTATION: Primary | ICD-10-CM

## 2022-02-24 PROCEDURE — 99499 UNLISTED E&M SERVICE: CPT | Mod: S$GLB,,, | Performed by: AUDIOLOGIST-HEARING AID FITTER

## 2022-02-24 PROCEDURE — 99499 NO LOS: ICD-10-PCS | Mod: S$GLB,,, | Performed by: AUDIOLOGIST-HEARING AID FITTER

## 2022-02-24 NOTE — PROGRESS NOTES
Jeffrey Schoen was seen on 02/24/2022 for a hearing aid consultation. Patient's audiogram was discussed in detail. We also discussed the different brands, sizes and levels of technology. It was decided based on the patients lifestyle that the best choice would be Phonak Audeo P90-R in color P7 with size 1M As and 2M AD receivers. The price quoted was $6255.00 with tax for 2 aids. Pt was informed that the hearing test would be valid for 6 months for the purchase of hearing aids and that they would need to pay for the hearing aids in full at time of fitting. Pt was also informed that insurance reimbursement by their insurance company for any hearing aid benefits would need to be filed for by the patient since [facility] does not file for insurance benefits for hearing aids at this time. The pt will call the clinic if he wishes to order the hearing aids.

## 2022-03-08 ENCOUNTER — TELEPHONE (OUTPATIENT)
Dept: AUDIOLOGY | Facility: CLINIC | Age: 72
End: 2022-03-08
Payer: MEDICARE

## 2022-03-08 ENCOUNTER — LAB VISIT (OUTPATIENT)
Dept: FAMILY MEDICINE | Facility: CLINIC | Age: 72
End: 2022-03-08
Payer: MEDICARE

## 2022-03-08 DIAGNOSIS — Z01.818 PRE-OP TESTING: ICD-10-CM

## 2022-03-08 LAB
SARS-COV-2 RNA RESP QL NAA+PROBE: NOT DETECTED
SARS-COV-2- CYCLE NUMBER: NORMAL

## 2022-03-08 PROCEDURE — U0003 INFECTIOUS AGENT DETECTION BY NUCLEIC ACID (DNA OR RNA); SEVERE ACUTE RESPIRATORY SYNDROME CORONAVIRUS 2 (SARS-COV-2) (CORONAVIRUS DISEASE [COVID-19]), AMPLIFIED PROBE TECHNIQUE, MAKING USE OF HIGH THROUGHPUT TECHNOLOGIES AS DESCRIBED BY CMS-2020-01-R: HCPCS | Performed by: PHYSICIAN ASSISTANT

## 2022-03-08 PROCEDURE — U0005 INFEC AGEN DETEC AMPLI PROBE: HCPCS | Performed by: PHYSICIAN ASSISTANT

## 2022-03-08 NOTE — TELEPHONE ENCOUNTER
Pt says he would like to hold off on ordering hearing aids at this time. He will call the clinic as needed.

## 2022-03-10 ENCOUNTER — TELEPHONE (OUTPATIENT)
Dept: PAIN MEDICINE | Facility: HOSPITAL | Age: 72
End: 2022-03-10
Payer: MEDICARE

## 2022-03-10 NOTE — TELEPHONE ENCOUNTER
----- Message from Tahmina Andrew MA sent at 3/10/2022  2:42 PM CST -----        ----- Message -----  From: Thi Clark RN  Sent: 3/10/2022   2:40 PM CST  To: [facility] Staff    Spoke with patient. He has been taking Advil for the last 2 days for a crick in his neck. Please call patient and advise if this will interfere with his procedure tomorrow. Thank you!

## 2022-03-11 ENCOUNTER — TELEPHONE (OUTPATIENT)
Dept: SURGERY | Facility: HOSPITAL | Age: 72
End: 2022-03-11
Payer: MEDICARE

## 2022-03-11 ENCOUNTER — HOSPITAL ENCOUNTER (OUTPATIENT)
Dept: RADIOLOGY | Facility: HOSPITAL | Age: 72
Discharge: HOME | End: 2022-03-11
Attending: ANESTHESIOLOGY
Payer: MEDICARE

## 2022-03-11 ENCOUNTER — HOSPITAL ENCOUNTER (OUTPATIENT)
Facility: HOSPITAL | Age: 72
Discharge: HOME | End: 2022-03-11
Attending: ANESTHESIOLOGY | Admitting: ANESTHESIOLOGY
Payer: MEDICARE

## 2022-03-11 VITALS
BODY MASS INDEX: 30.61 KG/M2 | SYSTOLIC BLOOD PRESSURE: 129 MMHG | WEIGHT: 226 LBS | DIASTOLIC BLOOD PRESSURE: 61 MMHG | RESPIRATION RATE: 16 BRPM | HEART RATE: 70 BPM | OXYGEN SATURATION: 98 % | TEMPERATURE: 98 F | HEIGHT: 72 IN

## 2022-03-11 DIAGNOSIS — M54.50 LOWER BACK PAIN: ICD-10-CM

## 2022-03-11 DIAGNOSIS — M54.16 LUMBAR RADICULOPATHY: ICD-10-CM

## 2022-03-11 PROCEDURE — 62323 PR INJ LUMBAR/SACRAL, W/IMAGING GUIDANCE: ICD-10-PCS | Mod: ,,, | Performed by: ANESTHESIOLOGY

## 2022-03-11 PROCEDURE — 25500020 PHARM REV CODE 255: Mod: PO | Performed by: ANESTHESIOLOGY

## 2022-03-11 PROCEDURE — 63600175 PHARM REV CODE 636 W HCPCS: Mod: PO | Performed by: ANESTHESIOLOGY

## 2022-03-11 PROCEDURE — 25000003 PHARM REV CODE 250: Mod: PO | Performed by: ANESTHESIOLOGY

## 2022-03-11 PROCEDURE — 62323 NJX INTERLAMINAR LMBR/SAC: CPT | Mod: PO | Performed by: ANESTHESIOLOGY

## 2022-03-11 PROCEDURE — 62323 NJX INTERLAMINAR LMBR/SAC: CPT | Mod: ,,, | Performed by: ANESTHESIOLOGY

## 2022-03-11 PROCEDURE — 76000 FLUOROSCOPY <1 HR PHYS/QHP: CPT | Mod: TC,PO

## 2022-03-11 RX ORDER — ALPRAZOLAM 0.5 MG/1
1 TABLET, ORALLY DISINTEGRATING ORAL ONCE AS NEEDED
Status: COMPLETED | OUTPATIENT
Start: 2022-03-11 | End: 2022-03-11

## 2022-03-11 RX ORDER — LIDOCAINE HYDROCHLORIDE 10 MG/ML
INJECTION, SOLUTION EPIDURAL; INFILTRATION; INTRACAUDAL; PERINEURAL
Status: DISCONTINUED | OUTPATIENT
Start: 2022-03-11 | End: 2022-03-11 | Stop reason: HOSPADM

## 2022-03-11 RX ORDER — METHYLPREDNISOLONE ACETATE 80 MG/ML
INJECTION, SUSPENSION INTRA-ARTICULAR; INTRALESIONAL; INTRAMUSCULAR; SOFT TISSUE
Status: DISCONTINUED | OUTPATIENT
Start: 2022-03-11 | End: 2022-03-11 | Stop reason: HOSPADM

## 2022-03-11 RX ADMIN — ALPRAZOLAM 1 MG: 0.5 TABLET, ORALLY DISINTEGRATING ORAL at 03:03

## 2022-03-11 NOTE — TELEPHONE ENCOUNTER
Pt scheduled for procedure this afternoon with Dr. Pressley. Pt left voicemail on POC line stating that he needed to speak with Dr. Pressley's nurse. Please call patient to follow up. Thank you!

## 2022-03-11 NOTE — DISCHARGE INSTRUCTIONS
PAIN MANAGEMENT    Home care instructions   Apply ice pack to the injection site for 20 minutes for the first 24 hours for soreness/discomfort at injection site   DO NOT USE HEAT FOR 24 HOURS   Keep site clean and dry for 24 hours, remove bandaid when desired   Do not drive until tomorrow  Take care when walking after a lumbar injection     STEROIDS OR RADIOFREQUENCY    May take 10-14 days for full effects  Avoid strenuous exercises for 2 days    Resume Aspirin, Plavix, or Coumadin the day after the procedure unless other wise instructed  Resume home medication as prescribed today      CALL PHYSICIAN FOR:  Severe increase in your usual pain or appearance of new pain  Prolonged or increasing weakness or numbness in the legs or arms  Fever greater than 100 degrees F.  Drainage from the incision site, redness, active bleeding or increased swelling at the injection site  Headache that increases when your head is upright and decreases when you lie flat    FOR EMERGENCIES:   Go directly to Emergency Department for Shortness of breath, chest pain, or problems breathing

## 2022-03-11 NOTE — OP NOTE

## 2022-03-18 PROBLEM — I25.10 CORONARY ARTERY DISEASE INVOLVING NATIVE CORONARY ARTERY OF NATIVE HEART WITHOUT ANGINA PECTORIS: Status: ACTIVE | Noted: 2019-03-20

## 2022-03-21 PROBLEM — I70.0 THORACIC AORTA ATHEROSCLEROSIS: Status: ACTIVE | Noted: 2022-03-21

## 2022-03-25 ENCOUNTER — OFFICE VISIT (OUTPATIENT)
Dept: PAIN MEDICINE | Facility: CLINIC | Age: 72
End: 2022-03-25
Payer: MEDICARE

## 2022-03-25 VITALS
HEIGHT: 72 IN | WEIGHT: 223.56 LBS | DIASTOLIC BLOOD PRESSURE: 59 MMHG | HEART RATE: 64 BPM | BODY MASS INDEX: 30.28 KG/M2 | SYSTOLIC BLOOD PRESSURE: 105 MMHG

## 2022-03-25 DIAGNOSIS — M50.30 DDD (DEGENERATIVE DISC DISEASE), CERVICAL: ICD-10-CM

## 2022-03-25 DIAGNOSIS — M48.061 SPINAL STENOSIS OF LUMBAR REGION WITHOUT NEUROGENIC CLAUDICATION: ICD-10-CM

## 2022-03-25 DIAGNOSIS — M47.812 CERVICAL SPONDYLOSIS: ICD-10-CM

## 2022-03-25 DIAGNOSIS — M51.36 DDD (DEGENERATIVE DISC DISEASE), LUMBAR: ICD-10-CM

## 2022-03-25 DIAGNOSIS — M54.16 LUMBAR RADICULOPATHY: Primary | ICD-10-CM

## 2022-03-25 PROCEDURE — 3074F SYST BP LT 130 MM HG: CPT | Mod: CPTII,S$GLB,, | Performed by: PHYSICIAN ASSISTANT

## 2022-03-25 PROCEDURE — 3288F FALL RISK ASSESSMENT DOCD: CPT | Mod: CPTII,S$GLB,, | Performed by: PHYSICIAN ASSISTANT

## 2022-03-25 PROCEDURE — 1160F RVW MEDS BY RX/DR IN RCRD: CPT | Mod: CPTII,S$GLB,, | Performed by: PHYSICIAN ASSISTANT

## 2022-03-25 PROCEDURE — 1101F PT FALLS ASSESS-DOCD LE1/YR: CPT | Mod: CPTII,S$GLB,, | Performed by: PHYSICIAN ASSISTANT

## 2022-03-25 PROCEDURE — 1159F MED LIST DOCD IN RCRD: CPT | Mod: CPTII,S$GLB,, | Performed by: PHYSICIAN ASSISTANT

## 2022-03-25 PROCEDURE — 99999 PR PBB SHADOW E&M-EST. PATIENT-LVL III: ICD-10-PCS | Mod: PBBFAC,,, | Performed by: PHYSICIAN ASSISTANT

## 2022-03-25 PROCEDURE — 99214 OFFICE O/P EST MOD 30 MIN: CPT | Mod: S$GLB,,, | Performed by: PHYSICIAN ASSISTANT

## 2022-03-25 PROCEDURE — 1159F PR MEDICATION LIST DOCUMENTED IN MEDICAL RECORD: ICD-10-PCS | Mod: CPTII,S$GLB,, | Performed by: PHYSICIAN ASSISTANT

## 2022-03-25 PROCEDURE — 1160F PR REVIEW ALL MEDS BY PRESCRIBER/CLIN PHARMACIST DOCUMENTED: ICD-10-PCS | Mod: CPTII,S$GLB,, | Performed by: PHYSICIAN ASSISTANT

## 2022-03-25 PROCEDURE — 3288F PR FALLS RISK ASSESSMENT DOCUMENTED: ICD-10-PCS | Mod: CPTII,S$GLB,, | Performed by: PHYSICIAN ASSISTANT

## 2022-03-25 PROCEDURE — 3078F DIAST BP <80 MM HG: CPT | Mod: CPTII,S$GLB,, | Performed by: PHYSICIAN ASSISTANT

## 2022-03-25 PROCEDURE — 99999 PR PBB SHADOW E&M-EST. PATIENT-LVL III: CPT | Mod: PBBFAC,,, | Performed by: PHYSICIAN ASSISTANT

## 2022-03-25 PROCEDURE — 1126F PR PAIN SEVERITY QUANTIFIED, NO PAIN PRESENT: ICD-10-PCS | Mod: CPTII,S$GLB,, | Performed by: PHYSICIAN ASSISTANT

## 2022-03-25 PROCEDURE — 3078F PR MOST RECENT DIASTOLIC BLOOD PRESSURE < 80 MM HG: ICD-10-PCS | Mod: CPTII,S$GLB,, | Performed by: PHYSICIAN ASSISTANT

## 2022-03-25 PROCEDURE — 99214 PR OFFICE/OUTPT VISIT, EST, LEVL IV, 30-39 MIN: ICD-10-PCS | Mod: S$GLB,,, | Performed by: PHYSICIAN ASSISTANT

## 2022-03-25 PROCEDURE — 3008F BODY MASS INDEX DOCD: CPT | Mod: CPTII,S$GLB,, | Performed by: PHYSICIAN ASSISTANT

## 2022-03-25 PROCEDURE — 1126F AMNT PAIN NOTED NONE PRSNT: CPT | Mod: CPTII,S$GLB,, | Performed by: PHYSICIAN ASSISTANT

## 2022-03-25 PROCEDURE — 1101F PR PT FALLS ASSESS DOC 0-1 FALLS W/OUT INJ PAST YR: ICD-10-PCS | Mod: CPTII,S$GLB,, | Performed by: PHYSICIAN ASSISTANT

## 2022-03-25 PROCEDURE — 3008F PR BODY MASS INDEX (BMI) DOCUMENTED: ICD-10-PCS | Mod: CPTII,S$GLB,, | Performed by: PHYSICIAN ASSISTANT

## 2022-03-25 PROCEDURE — 3074F PR MOST RECENT SYSTOLIC BLOOD PRESSURE < 130 MM HG: ICD-10-PCS | Mod: CPTII,S$GLB,, | Performed by: PHYSICIAN ASSISTANT

## 2022-03-25 NOTE — PROGRESS NOTES
This note was completed with dictation software and grammatical errors may exist.    CC:  Right leg pain    HPI:  The patient is a 71-year-old man with a history of CAD on aspirin, nephrolithiasis, hypothyroidism who presents in referral from Dr. Mcnelil for back and leg pain.  He is status post L5/S1 interlaminar epidural steroid injection to the right on 03/11/2022 with almost 100% relief until yesterday.  Today he does feel well and walked 2 miles this morning without pain.  However yesterday he had to be on his feet several times for an extended period of time which cause right posterolateral thigh pain.  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  He does have relief immediately with sitting.  He is not having pain on a daily basis, reports having a meeting in the evening or a function in the evening is more likely to be aggravating.  His other complaint is prior to the procedure he had discontinued of leave and developed right sided neck pain that lasted 1 day.  He then had left-sided neck pain that lasted a day or 2 and now the pain has moved to the middle.  Since the injection in his back this pain is also much better although he does have some tightness.  He did have x-rays taken ordered by his primary care physician and we reviewed these as noted below.  He denies weakness or numbness.    Previous history:  The patient reports that about 6 months ago he began having right posterior thigh pain.  He states that it was not severe at 1st but has been worsening.  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  At 1 time he was also having bilateral foot numbness but this has resolved.  He states that the pain is worse the longer he has been sitting  and when he goes to stand up.  He denies any pain with walking and in fact he walks 2 miles every morning and feels better with this.  He states that it is particularly worse with prolonged sitting such as when driving.  He denies any pain in the back or in his left leg.  He denies any weakness.    Pain intervention history:     He is status post L5/S1 interlaminar epidural steroid injection on 10/29/2021 with 50% relief.   He is status post L5/S1 interlaminar epidural steroid injection to the right on 01/14/2022 with 100% relief lasting about 2 weeks, now reporting 65-70% relief.    He is status post L5/S1 interlaminar epidural steroid injection to the right on 03/11/2022 with almost 100% relief until yesterday.     Spine surgeries:  None    Antineuropathics:  NSAIDs:  Physical therapy:  He has done physical therapy for over a month at [facility]  Antidepressants:  Muscle relaxers:  Opioids:  Antiplatelets/Anticoagulants:  Aspirin 325    ROS:  He reports leg pain only.  Balance of review of systems is negative.    No results found for: LABA1C, HGBA1C    Lab Results   Component Value Date    WBC 6.67 03/17/2022    HGB 13.7 (L) 03/17/2022    HCT 43.1 03/17/2022    MCV 94 03/17/2022     03/17/2022             Past Medical History:   Diagnosis Date    Coronary artery disease involving native coronary artery of native heart without angina pectoris 3/20/2019    Left heart cath 9/10/2007: 30% mid-LAD, 60% ostial D1, LCX with 40% mid, RCA with 30% mid-stenosis, 100% PLB.    Diverticulosis, sigmoid     MILD     Gout     Hyperlipidemia     Hypertension     Hypothyroidism     Lentigo maligna 2/26/2019    Polyp of ascending colon 01/22/2019    (.5 CM)      Polyp of cecum 01/22/2019    Polyp of descending colon 01/22/2019    (2)   0.5 CM POLYP AND (1 )  0.8CM POLYP    Polyp of transverse colon 01/22/2019    Polyp, sigmoid colon     (2) 0.5 CM POLYP       Past Surgical History:   Procedure Laterality Date     ANGIOGRAM, CORONARY, WITH LEFT HEART CATHETERIZATION      COLONOSCOPY  01/22/2019    repeat will be determined by biopsy results of polyps removed     EPIDURAL STEROID INJECTION INTO LUMBAR SPINE N/A 10/29/2021    Procedure: Injection-steroid-epidural-lumbar L5/S1;  Surgeon: Frank Smith MD;  Location: [facility] OR;  Service: Pain Management;  Laterality: N/A;    EPIDURAL STEROID INJECTION INTO LUMBAR SPINE N/A 01/14/2022    Procedure: Injection-steroid-epidural-lumbar L5/S1 to Right;  Surgeon: Frank Smith MD;  Location: [facility] OR;  Service: Pain Management;  Laterality: N/A;    EPIDURAL STEROID INJECTION INTO LUMBAR SPINE N/A 03/11/2022    Procedure: Injection-steroid-epidural-lumbar L5/S1 to Right;  Surgeon: Frank Smith MD;  Location: [facility] OR;  Service: Pain Management;  Laterality: N/A;    spinal injections       per dr smith   3 of them last one 2 weeks ago    TONSILLECTOMY         Social History     Socioeconomic History    Marital status:      Spouse name: ivana   Tobacco Use    Smoking status: Never Smoker    Smokeless tobacco: Never Used   Substance and Sexual Activity    Alcohol use: Yes     Alcohol/week: 1.0 standard drink     Types: 1 Shots of liquor per week    Drug use: Never    Sexual activity: Yes     Partners: Female     Birth control/protection: None     Social Determinants of Health     Financial Resource Strain: Low Risk     Difficulty of Paying Living Expenses: Not hard at all   Food Insecurity: No Food Insecurity    Worried About Running Out of Food in the Last Year: Never true    Ran Out of Food in the Last Year: Never true   Transportation Needs: No Transportation Needs    Lack of Transportation (Medical): No    Lack of Transportation (Non-Medical): No   Physical Activity: Insufficiently Active    Days of Exercise per Week: 4 days    Minutes of Exercise per Session: 30 min   Stress: No Stress Concern Present    Feeling of Stress : Not at all    Social Connections: Unknown    Frequency of Communication with Friends and Family: More than three times a week    Frequency of Social Gatherings with Friends and Family: More than three times a week    Active Member of Clubs or Organizations: Yes    Attends Club or Organization Meetings: More than 4 times per year    Marital Status:          Medications/Allergies: See med card    Vitals:    22 0740   BP: (!) 105/59   Pulse: 64   Weight: 101.4 kg (223 lb 8.7 oz)   Height: 6' (1.829 m)   PainSc: 0-No pain         Physical exam:  Gen: A and O x3, pleasant, well-groomed  Skin: No rashes or obvious lesions  HEENT: PERRLA, no obvious deformities on ears or in canals. Trachea midline.  CVS: Regular rate and rhythm, normal palpable pulses.  Resp:No increased work of breathing, symmetrical chest rise.  Abdomen: Soft, NT/ND.  Musculoskeletal: Able to heel walk, toe walk. No antalgic gait.     Neuro:  Upper extremities:  5/5 strength bilaterally  Lower extremities: 5/5 strength bilaterally  Reflexes:  Biceps 2+, triceps 2+, brachioradialis 2+, Patellar 2+, Achilles 2+ bilaterally.  Sensory:  Intact and symmetrical to light touch and pinprick in C2-T1 and L2-S1 dermatomes bilaterally.    Cervical spine:  Range of motion is full with flexion, extension and lateral rotation without increased pain.  Myofascial exam:  No tenderness to palpation to the cervical paraspinous muscles.    Lumbar spine:  Lumbar spine: ROM is full with flexion extension and oblique extension with no increased pain.    Ang's test causes no increased pain on either side.    Supine straight leg raise is negative bilaterally.    Internal and external rotation of the hip causes no increased pain on either side.  Myofascial exam: No tenderness to palpation across lumbar paraspinous muscles.      Imagin21 MRI L-spine:  The conus ends at T12-L1.  No diffuse abnormal marrow or central conus signal is appreciated. No paraspinal  fluid collections are appreciated. Osseous alignment appears maintained. There is some straightening of the curvature overall.  This may be positional versus muscular spasm.  There appears to be some incidental slight superior bladder wall thickening although may be exaggerated with underdistention.  There is some subcentimeter renal cystic averaging demonstrated.  There is decreased disc water signal overall throughout the lumbar spine with spurring and endplate degeneration.  There is posterior element, ligamentous hypertrophy with mid to lower lumbar distribution predominance.  No extruded  type fragment is currently appreciated.   L1-2 demonstrates broad-based concentric disc bulging with narrowing and annular fissuring.  There is mild lateral recess, inferior neural foraminal narrowing along with thecal sac triangulation.  L2-3 demonstrates broad-based concentric disc bulging with some thecal sac triangulation and annular fissuring.  There is mild to moderate neural foraminal narrowing demonstrated bilaterally.  There appears to be some lateral protrusion appearance more so on the right.   L3-4 demonstrates broad-based concentric disc bulging with thecal sac triangulation along with moderate neural foraminal narrowing bilaterally.   L4-5 demonstrates concentric disc bulging with thecal sac triangulation, narrowing and annular fissuring.  There is moderate to significant neural foraminal narrowing demonstrated bilaterally.   L5-S1 demonstrates broad-based concentric disc bulging with annular fissuring and thecal sac triangulation.  There is moderate to significant neural foraminal narrowing demonstrated bilaterally right slightly more so than left with suspected exiting nerve root contact.      Assessment:   The patient is a 71-year-old man with a history of CAD on aspirin, nephrolithiasis, hypothyroidism who presents in referral from Dr. Mcneill for back and leg pain.    1. Lumbar radiculopathy     2.  Spinal stenosis of lumbar region without neurogenic claudication     3. DDD (degenerative disc disease), lumbar     4. DDD (degenerative disc disease), cervical     5. Cervical spondylosis         Plan:  1. The patient has been doing very well following the 3rd L5/S1 interlaminar epidural steroid injection to the right.  He has had 1 bad day and I will have him contact me in about 1 week to give me a report on how he is doing.  We discussed considering a right L4/5 and L5/S1 transforaminal epidural steroid injection in mid May because he is leaving for a vacation to [place] on June 8th.  We can set this procedure up over the phone if necessary.  2. We reviewed his cervical spine x-rays and discussed that he has arthritis and disc degeneration.  If his neck pain worsens I will obtain a cervical spine MRI for further evaluation.       The total time spent for evaluation and management on 03/25/2022 including reviewing separately obtained history, performing a medically appropriate exam and evaluation, documenting clinical information in the health record, independently interpreting results and communicating them to the patient/family/caregiver, and ordering medications/tests/procedures was between 30-39 minutes.

## 2022-04-01 ENCOUNTER — PATIENT MESSAGE (OUTPATIENT)
Dept: PAIN MEDICINE | Facility: CLINIC | Age: 72
End: 2022-04-01
Payer: MEDICARE

## 2022-04-07 DIAGNOSIS — M54.16 LUMBAR RADICULOPATHY: Primary | ICD-10-CM

## 2022-04-07 RX ORDER — ALPRAZOLAM 0.5 MG/1
1 TABLET, ORALLY DISINTEGRATING ORAL ONCE AS NEEDED
Status: CANCELLED | OUTPATIENT
Start: 2022-05-27 | End: 2033-10-22

## 2022-04-08 ENCOUNTER — PATIENT MESSAGE (OUTPATIENT)
Dept: PAIN MEDICINE | Facility: CLINIC | Age: 72
End: 2022-04-08
Payer: MEDICARE

## 2022-04-11 NOTE — TELEPHONE ENCOUNTER
Patient scheduled for Transforaminal Epidural Steroid Injection 5/27. Need ok to stop ASA 7 days prior.

## 2022-05-27 ENCOUNTER — HOSPITAL ENCOUNTER (OUTPATIENT)
Facility: HOSPITAL | Age: 72
Discharge: HOME | End: 2022-05-27
Attending: ANESTHESIOLOGY | Admitting: ANESTHESIOLOGY
Payer: MEDICARE

## 2022-05-27 ENCOUNTER — HOSPITAL ENCOUNTER (OUTPATIENT)
Dept: RADIOLOGY | Facility: HOSPITAL | Age: 72
Discharge: HOME | End: 2022-05-27
Attending: ANESTHESIOLOGY
Payer: MEDICARE

## 2022-05-27 DIAGNOSIS — M54.16 LUMBAR RADICULOPATHY: ICD-10-CM

## 2022-05-27 DIAGNOSIS — M54.50 LOWER BACK PAIN: ICD-10-CM

## 2022-05-27 PROCEDURE — 25000003 PHARM REV CODE 250: Mod: PO | Performed by: ANESTHESIOLOGY

## 2022-05-27 PROCEDURE — 64483 NJX AA&/STRD TFRM EPI L/S 1: CPT | Mod: RT,,, | Performed by: ANESTHESIOLOGY

## 2022-05-27 PROCEDURE — 64483 PR EPIDURAL INJ, ANES/STEROID, TRANSFORAMINAL, LUMB/SACR, SNGL LEVL: ICD-10-PCS | Mod: RT,,, | Performed by: ANESTHESIOLOGY

## 2022-05-27 PROCEDURE — 64484 NJX AA&/STRD TFRM EPI L/S EA: CPT | Mod: RT,,, | Performed by: ANESTHESIOLOGY

## 2022-05-27 PROCEDURE — 64484 PRA INJECT ANES/STEROID FORAMEN LUMBAR/SACRAL W IMG GUIDE ,EA ADD LEVEL: ICD-10-PCS | Mod: RT,,, | Performed by: ANESTHESIOLOGY

## 2022-05-27 PROCEDURE — 64484 NJX AA&/STRD TFRM EPI L/S EA: CPT | Mod: PO | Performed by: ANESTHESIOLOGY

## 2022-05-27 PROCEDURE — 25500020 PHARM REV CODE 255: Mod: PO | Performed by: ANESTHESIOLOGY

## 2022-05-27 PROCEDURE — 63600175 PHARM REV CODE 636 W HCPCS: Mod: PO | Performed by: ANESTHESIOLOGY

## 2022-05-27 PROCEDURE — 64483 NJX AA&/STRD TFRM EPI L/S 1: CPT | Mod: PO | Performed by: ANESTHESIOLOGY

## 2022-05-27 PROCEDURE — 76000 FLUOROSCOPY <1 HR PHYS/QHP: CPT | Mod: TC,PO

## 2022-05-27 RX ORDER — METHYLPREDNISOLONE ACETATE 80 MG/ML
INJECTION, SUSPENSION INTRA-ARTICULAR; INTRALESIONAL; INTRAMUSCULAR; SOFT TISSUE
Status: DISCONTINUED | OUTPATIENT
Start: 2022-05-27 | End: 2022-05-27 | Stop reason: HOSPADM

## 2022-05-27 RX ORDER — LIDOCAINE HYDROCHLORIDE 10 MG/ML
INJECTION, SOLUTION EPIDURAL; INFILTRATION; INTRACAUDAL; PERINEURAL
Status: DISCONTINUED | OUTPATIENT
Start: 2022-05-27 | End: 2022-05-27 | Stop reason: HOSPADM

## 2022-05-27 RX ORDER — ALPRAZOLAM 0.5 MG/1
1 TABLET, ORALLY DISINTEGRATING ORAL ONCE AS NEEDED
Status: COMPLETED | OUTPATIENT
Start: 2022-05-27 | End: 2022-05-27

## 2022-05-27 RX ORDER — BUPIVACAINE HYDROCHLORIDE 2.5 MG/ML
INJECTION, SOLUTION EPIDURAL; INFILTRATION; INTRACAUDAL
Status: DISCONTINUED | OUTPATIENT
Start: 2022-05-27 | End: 2022-05-27 | Stop reason: HOSPADM

## 2022-05-27 RX ADMIN — ALPRAZOLAM 1 MG: 0.5 TABLET, ORALLY DISINTEGRATING ORAL at 03:05

## 2022-05-27 NOTE — H&P
CC: Back pain    HPI: The patient is a 70yo man with a history of lumbar radiculopathy here for a right L4/5 and L5/S1 TFESI. There are no major changes in history and physical from 3/25/22.    Past Medical History:   Diagnosis Date    Coronary artery disease involving native coronary artery of native heart without angina pectoris 3/20/2019    Left heart cath 9/10/2007: 30% mid-LAD, 60% ostial D1, LCX with 40% mid, RCA with 30% mid-stenosis, 100% PLB.    Diverticulosis, sigmoid     MILD     Gout     Hyperlipidemia     Hypertension     Hypothyroidism     Lentigo maligna 2/26/2019    Polyp of ascending colon 01/22/2019    (.5 CM)      Polyp of cecum 01/22/2019    Polyp of descending colon 01/22/2019    (2)   0.5 CM POLYP AND (1 )  0.8CM POLYP    Polyp of transverse colon 01/22/2019    Polyp, sigmoid colon     (2) 0.5 CM POLYP       Past Surgical History:   Procedure Laterality Date    ANGIOGRAM, CORONARY, WITH LEFT HEART CATHETERIZATION      COLONOSCOPY  01/22/2019    repeat will be determined by biopsy results of polyps removed     EPIDURAL STEROID INJECTION INTO LUMBAR SPINE N/A 10/29/2021    Procedure: Injection-steroid-epidural-lumbar L5/S1;  Surgeon: Frank Smith MD;  Location: [facility] OR;  Service: Pain Management;  Laterality: N/A;    EPIDURAL STEROID INJECTION INTO LUMBAR SPINE N/A 01/14/2022    Procedure: Injection-steroid-epidural-lumbar L5/S1 to Right;  Surgeon: Frank Smith MD;  Location: [facility] OR;  Service: Pain Management;  Laterality: N/A;    EPIDURAL STEROID INJECTION INTO LUMBAR SPINE N/A 03/11/2022    Procedure: Injection-steroid-epidural-lumbar L5/S1 to Right;  Surgeon: Frank Smith MD;  Location: [facility] OR;  Service: Pain Management;  Laterality: N/A;    spinal injections       per dr smith   3 of them last one 2 weeks ago    TONSILLECTOMY         Family History   Problem Relation Age of Onset    Cancer Mother         breast    Breast cancer Mother     Heart  disease Father         stroke, bypass    Stroke Father        Social History     Socioeconomic History    Marital status:      Spouse name: ivana   Tobacco Use    Smoking status: Never Smoker    Smokeless tobacco: Never Used   Substance and Sexual Activity    Alcohol use: Yes     Alcohol/week: 1.0 standard drink     Types: 1 Shots of liquor per week    Drug use: Never    Sexual activity: Yes     Partners: Female     Birth control/protection: None       No current facility-administered medications for this encounter.       Review of patient's allergies indicates:   Allergen Reactions    Achromycin      Skin redness      Rosuvastatin      arthralgia       Vitals:    05/25/22 1655 05/27/22 1543 05/27/22 1558 05/27/22 1603   BP:  119/66 124/71 135/78   Pulse:  62 63 73   Resp:  17 16 16   Temp:  98.1 °F (36.7 °C)     SpO2:  98% 97% 97%   Weight: 99.8 kg (220 lb)      Height: 6' (1.829 m)          ASA 2, Mallampati 2    REVIEW OF SYSTEMS:     GENERAL: No weight loss, malaise or fevers.  HEENT:  No recent changes in vision or hearing  NECK: Negative for lumps, no difficulty with swallowing.  RESPIRATORY: Negative for cough, wheezing or shortness of breath, patient denies any recent URI.  CARDIOVASCULAR: Negative for chest pain, leg swelling or palpitations.  GI: Negative for abdominal discomfort, blood in stools or black stools or change in bowel habits.  MUSCULOSKELETAL: See HPI.  SKIN: Negative for lesions, rash, and itching.  PSYCH: No suicidal or homicidal ideations, no current mood disturbances.  HEMATOLOGY/LYMPHOLOGY: Negative for prolonged bleeding, bruising easily or swollen nodes. Patient is not currently taking any anti-coagulants  ENDO: No history of diabetes or thyroid dysfunction  NEURO: No history of syncope, paralysis, seizures or tremors.All other reviewed and negative other than HPI.    Physical exam:  Gen: A and O x3, pleasant, well-groomed  Skin: No rashes or obvious lesions  HEENT:  PERRLA, no obvious deformities on ears or in canals. No thyroid masses, trachea midline, no palpable lymph nodes in neck, axilla.  CVS: Regular rate and rhythm, normal S1 and S2, no murmurs.  Resp: Clear to auscultation bilaterally.  Abdomen: Soft, NT/ND, normal bowel sounds present.  Musculoskeletal/Neuro: Moving all extremities    Assessment:  Lumbar radiculopathy  -     Case Request Operating Room: Injection,steroid,epidural,transforaminal approach L4/5 L5/S1  -     Vital signs; Standing  -     Verify informed consent; Standing  -     Notify physician ; Standing  -     Notify physician ; Standing  -     Notify physician (specify); Standing  -     Diet NPO; Standing  -     Place in Outpatient; Standing  -     alprazolam ODT dissolvable tablet 1 mg  -     Activity as tolerated  -     No dressing needed  -     Diet Adult Regular  -     Notify your health care provider if you experience any of the following:  temperature >100.4    Other orders  -     IP VTE LOW RISK PATIENT; Standing  -     BUPivacaine (PF) 0.25% (2.5 mg/ml) injection  -     iohexoL (OMNIPAQUE 300) injection  -     LIDOcaine (PF) 10 mg/ml (1%) injection  -     methylPREDNISolone acetate injection  -     DISCHARGE PATIENT; Standing  -     Discontinue IV; Standing  -     Discontinue Telemetry - Prior to Discharge; Standing

## 2022-05-27 NOTE — DISCHARGE SUMMARY
[facility] - Surgery  Discharge Note  Short Stay    Procedure(s) (LRB):  Injection,steroid,epidural,transforaminal approach L4/5 L5/S1 (Right)    OUTCOME: Patient tolerated treatment/procedure well without complication and is now ready for discharge.    DISPOSITION: Home or Self Care    FINAL DIAGNOSIS:  Lumbar radiculopathy    FOLLOWUP: In clinic    DISCHARGE INSTRUCTIONS:    Discharge Procedure Orders   Diet Adult Regular     No dressing needed     Notify your health care provider if you experience any of the following:  temperature >100.4     Activity as tolerated

## 2022-05-27 NOTE — DISCHARGE INSTRUCTIONS

## 2022-05-27 NOTE — OP NOTE
PROCEDURE DATE: 5/27/2022    PROCEDURE: Right L4/5 and L5/S1 transforaminal epidural steroid injection under fluoroscopy    DIAGNOSIS: Lumbar  Radiculopathy    Post op diagnosis: Same    PHYSICIAN: Frank Pressley MD    MEDICATIONS INJECTED:  Methylprednisolone 40mg (1ml) and 1ml 0.25% bupivicaine at each nerve root.     LOCAL ANESTHETIC INJECTED:  Lidocaine 1%. 4 ml per site.    SEDATION MEDICATIONS: none    ESTIMATED BLOOD LOSS:  none    COMPLICATIONS:  none    TECHNIQUE:   A time-out was taken to identify patient and procedure side prior to starting the procedure. The patient was placed in a prone position, prepped and draped in the usual sterile fashion using ChloraPrep and sterile towels.  The area to be injected was determined under fluoroscopic guidance in AP and oblique view.  Local anesthetic was given by raising a wheal and going down to the hub of a 25-gauge 1.5 inch needle.  In oblique view, a 5 inch 22-gauge bent-tip spinal needle was introduced towards 6 oclock position of the pedicle of each above named nerve root level.  The needle was walked medially then hinged into the neural foramen and position was confirmed in AP and lateral views.  Omnipaque contrast dye was injected to confirm appropriate placement and that there was no vascular uptake.  After negative aspiration for blood or CSF, the medication was then injected. This was performed at the right L4/5 and L5/S1 level(s). The patient tolerated the procedure well.    The patient was monitored after the procedure.  Patient was given post procedure and discharge instructions to follow at home. The patient was discharged in a stable condition.

## 2022-05-30 VITALS
WEIGHT: 220 LBS | TEMPERATURE: 98 F | DIASTOLIC BLOOD PRESSURE: 55 MMHG | RESPIRATION RATE: 16 BRPM | HEIGHT: 72 IN | HEART RATE: 67 BPM | BODY MASS INDEX: 29.8 KG/M2 | OXYGEN SATURATION: 97 % | SYSTOLIC BLOOD PRESSURE: 98 MMHG

## 2022-08-26 ENCOUNTER — PATIENT MESSAGE (OUTPATIENT)
Dept: PAIN MEDICINE | Facility: CLINIC | Age: 72
End: 2022-08-26
Payer: MEDICARE

## 2022-09-02 ENCOUNTER — OFFICE VISIT (OUTPATIENT)
Dept: PAIN MEDICINE | Facility: CLINIC | Age: 72
End: 2022-09-02
Payer: MEDICARE

## 2022-09-02 VITALS
BODY MASS INDEX: 30.26 KG/M2 | HEART RATE: 84 BPM | DIASTOLIC BLOOD PRESSURE: 53 MMHG | OXYGEN SATURATION: 95 % | WEIGHT: 223.44 LBS | HEIGHT: 72 IN | SYSTOLIC BLOOD PRESSURE: 99 MMHG

## 2022-09-02 DIAGNOSIS — M48.061 SPINAL STENOSIS OF LUMBAR REGION WITHOUT NEUROGENIC CLAUDICATION: ICD-10-CM

## 2022-09-02 DIAGNOSIS — M51.36 DDD (DEGENERATIVE DISC DISEASE), LUMBAR: ICD-10-CM

## 2022-09-02 DIAGNOSIS — M54.16 LUMBAR RADICULOPATHY: Primary | ICD-10-CM

## 2022-09-02 DIAGNOSIS — M47.816 LUMBAR SPONDYLOSIS: ICD-10-CM

## 2022-09-02 PROCEDURE — 3008F PR BODY MASS INDEX (BMI) DOCUMENTED: ICD-10-PCS | Mod: CPTII,S$GLB,,

## 2022-09-02 PROCEDURE — 3008F BODY MASS INDEX DOCD: CPT | Mod: CPTII,S$GLB,,

## 2022-09-02 PROCEDURE — 99999 PR PBB SHADOW E&M-EST. PATIENT-LVL IV: CPT | Mod: PBBFAC,,,

## 2022-09-02 PROCEDURE — 1126F AMNT PAIN NOTED NONE PRSNT: CPT | Mod: CPTII,S$GLB,,

## 2022-09-02 PROCEDURE — 1159F MED LIST DOCD IN RCRD: CPT | Mod: CPTII,S$GLB,,

## 2022-09-02 PROCEDURE — 99214 PR OFFICE/OUTPT VISIT, EST, LEVL IV, 30-39 MIN: ICD-10-PCS | Mod: S$GLB,,,

## 2022-09-02 PROCEDURE — 3074F SYST BP LT 130 MM HG: CPT | Mod: CPTII,S$GLB,,

## 2022-09-02 PROCEDURE — 3074F PR MOST RECENT SYSTOLIC BLOOD PRESSURE < 130 MM HG: ICD-10-PCS | Mod: CPTII,S$GLB,,

## 2022-09-02 PROCEDURE — 1159F PR MEDICATION LIST DOCUMENTED IN MEDICAL RECORD: ICD-10-PCS | Mod: CPTII,S$GLB,,

## 2022-09-02 PROCEDURE — 3078F DIAST BP <80 MM HG: CPT | Mod: CPTII,S$GLB,,

## 2022-09-02 PROCEDURE — 3078F PR MOST RECENT DIASTOLIC BLOOD PRESSURE < 80 MM HG: ICD-10-PCS | Mod: CPTII,S$GLB,,

## 2022-09-02 PROCEDURE — 99999 PR PBB SHADOW E&M-EST. PATIENT-LVL IV: ICD-10-PCS | Mod: PBBFAC,,,

## 2022-09-02 PROCEDURE — 3288F FALL RISK ASSESSMENT DOCD: CPT | Mod: CPTII,S$GLB,,

## 2022-09-02 PROCEDURE — 99214 OFFICE O/P EST MOD 30 MIN: CPT | Mod: S$GLB,,,

## 2022-09-02 PROCEDURE — 1101F PT FALLS ASSESS-DOCD LE1/YR: CPT | Mod: CPTII,S$GLB,,

## 2022-09-02 PROCEDURE — 3288F PR FALLS RISK ASSESSMENT DOCUMENTED: ICD-10-PCS | Mod: CPTII,S$GLB,,

## 2022-09-02 PROCEDURE — 1101F PR PT FALLS ASSESS DOC 0-1 FALLS W/OUT INJ PAST YR: ICD-10-PCS | Mod: CPTII,S$GLB,,

## 2022-09-02 PROCEDURE — 1126F PR PAIN SEVERITY QUANTIFIED, NO PAIN PRESENT: ICD-10-PCS | Mod: CPTII,S$GLB,,

## 2022-09-02 RX ORDER — ALPRAZOLAM 0.5 MG/1
1 TABLET, ORALLY DISINTEGRATING ORAL ONCE AS NEEDED
Status: CANCELLED | OUTPATIENT
Start: 2022-09-02 | End: 2034-01-29

## 2022-09-02 NOTE — PROGRESS NOTES
This note was completed with dictation software and grammatical errors may exist.    CC:  Right leg pain    HPI:  The patient is a 71-year-old man with a history of CAD on aspirin, nephrolithiasis, hypothyroidism who presents in referral from Dr. Mcneill for back and leg pain.  Today he is reporting return of his lower back pain with radiation down his right leg.  He states the pain is similar to the past with on the lateral aspect of his right leg.  He rates his pain as a 4-8/10, worse with activities such as standing and walking for extended periods of time.  His last injection was a right-sided L4/5 and L5/S1 transforaminal GIANNA on 05/27/2022 with excellent relief of his pain lasting over 3 months.  Today he denies any associated numbness weakness or changes with his bowel or bladder function.        Previous history:  The patient reports that about 6 months ago he began having right posterior thigh pain.  He states that it was not severe at 1st but has been worsening.  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  At 1 time he was also having bilateral foot numbness but this has resolved.  He states that the pain is worse the longer he has been sitting and when he goes to stand up.  He denies any pain with walking and in fact he walks 2 miles every morning and feels better with this.  He states that it is particularly worse with prolonged sitting such as when driving.  He denies any pain in the back or in his left leg.  He denies any weakness.    Pain intervention history:     He is status post L5/S1 interlaminar epidural steroid injection on 10/29/2021 with 50% relief.   He is status post L5/S1 interlaminar epidural steroid injection to the right on 01/14/2022 with 100% relief lasting about 2 weeks, now reporting 65-70% relief.    He is status post L5/S1 interlaminar  epidural steroid injection to the right on 03/11/2022 with almost 100% relief until yesterday.  Status post right-sided L4/5 and L5/S1 transforaminal GIANNA on 05/27/2022 with excellent relief of his pain lasting over 3 months.    Spine surgeries:  None    Antineuropathics:  NSAIDs:  Physical therapy:  He has done physical therapy for over a month at [facility]  Antidepressants:  Muscle relaxers:  Opioids:  Antiplatelets/Anticoagulants:  Aspirin 325    ROS:  He reports leg pain only.  Balance of review of systems is negative.    No results found for: LABA1C, HGBA1C    Lab Results   Component Value Date    WBC 6.67 03/17/2022    HGB 13.7 (L) 03/17/2022    HCT 43.1 03/17/2022    MCV 94 03/17/2022     03/17/2022             Past Medical History:   Diagnosis Date    Coronary artery disease involving native coronary artery of native heart without angina pectoris 03/20/2019    Left heart cath 9/10/2007: 30% mid-LAD, 60% ostial D1, LCX with 40% mid, RCA with 30% mid-stenosis, 100% PLB.    Diverticulosis, sigmoid     MILD     Gout     Hyperlipidemia     Hypertension     Hypothyroidism     Lentigo maligna 02/26/2019    Personal history of colonic polyps 01/22/2019       Past Surgical History:   Procedure Laterality Date    ANGIOGRAM, CORONARY, WITH LEFT HEART CATHETERIZATION      COLONOSCOPY  01/22/2019    repeat will be determined by biopsy results of polyps removed     EPIDURAL STEROID INJECTION INTO LUMBAR SPINE N/A 10/29/2021    Procedure: Injection-steroid-epidural-lumbar L5/S1;  Surgeon: Frank Pressley MD;  Location: [facility] OR;  Service: Pain Management;  Laterality: N/A;    EPIDURAL STEROID INJECTION INTO LUMBAR SPINE N/A 01/14/2022    Procedure: Injection-steroid-epidural-lumbar L5/S1 to Right;  Surgeon: Frank Pressley MD;  Location: [facility] OR;  Service: Pain Management;  Laterality: N/A;    EPIDURAL STEROID INJECTION INTO LUMBAR SPINE N/A 03/11/2022    Procedure: Injection-steroid-epidural-lumbar L5/S1 to  Right;  Surgeon: Frank Smith MD;  Location: [facility] OR;  Service: Pain Management;  Laterality: N/A;    spinal injections       per dr smith   3 of them last one 2 weeks ago    TONSILLECTOMY      TRANSFORAMINAL EPIDURAL INJECTION OF STEROID Right 5/27/2022    Procedure: Injection,steroid,epidural,transforaminal approach L4/5 L5/S1;  Surgeon: Frank Smith MD;  Location: [facility] OR;  Service: Pain Management;  Laterality: Right;       Social History     Socioeconomic History    Marital status:      Spouse name: ivana   Tobacco Use    Smoking status: Never    Smokeless tobacco: Never   Substance and Sexual Activity    Alcohol use: Yes     Alcohol/week: 1.0 standard drink     Types: 1 Shots of liquor per week    Drug use: Never    Sexual activity: Yes     Partners: Female     Birth control/protection: None         Medications/Allergies: See med card    Vitals:    09/02/22 1431   BP: (!) 99/53   Pulse: 84   SpO2: 95%   Weight: 101.4 kg (223 lb 7 oz)   Height: 6' (1.829 m)   PainSc: 0-No pain         Physical exam:  Gen: A and O x3, pleasant, well-groomed  Skin: No rashes or obvious lesions  HEENT: PERRLA, no obvious deformities on ears or in canals. Trachea midline.  CVS: Regular rate and rhythm, normal palpable pulses.  Resp:No increased work of breathing, symmetrical chest rise.  Abdomen: Soft, NT/ND.  Musculoskeletal: Able to heel walk, toe walk. No antalgic gait.     Neuro:  Lower extremities: 5/5 strength bilaterally  Reflexes:   Patellar 2+, Achilles 2+ bilaterally.  Sensory:  Intact and symmetrical to light touch and pinprick in C2-T1 and L2-S1 dermatomes bilaterally.      Lumbar spine:  Lumbar spine: ROM is full with flexion extension and oblique extension with no increased pain.    Ang's test causes no increased pain on either side.    Supine straight leg raise is negative bilaterally.    Internal and external rotation of the hip causes no increased pain on either side.  Myofascial exam:  No tenderness to palpation across lumbar paraspinous muscles.      Imagin21 MRI L-spine:  The conus ends at T12-L1.  No diffuse abnormal marrow or central conus signal is appreciated. No paraspinal fluid collections are appreciated. Osseous alignment appears maintained. There is some straightening of the curvature overall.  This may be positional versus muscular spasm.  There appears to be some incidental slight superior bladder wall thickening although may be exaggerated with underdistention.  There is some subcentimeter renal cystic averaging demonstrated.  There is decreased disc water signal overall throughout the lumbar spine with spurring and endplate degeneration.  There is posterior element, ligamentous hypertrophy with mid to lower lumbar distribution predominance.  No extruded  type fragment is currently appreciated.   L1-2 demonstrates broad-based concentric disc bulging with narrowing and annular fissuring.  There is mild lateral recess, inferior neural foraminal narrowing along with thecal sac triangulation.  L2-3 demonstrates broad-based concentric disc bulging with some thecal sac triangulation and annular fissuring.  There is mild to moderate neural foraminal narrowing demonstrated bilaterally.  There appears to be some lateral protrusion appearance more so on the right.   L3-4 demonstrates broad-based concentric disc bulging with thecal sac triangulation along with moderate neural foraminal narrowing bilaterally.   L4-5 demonstrates concentric disc bulging with thecal sac triangulation, narrowing and annular fissuring.  There is moderate to significant neural foraminal narrowing demonstrated bilaterally.   L5-S1 demonstrates broad-based concentric disc bulging with annular fissuring and thecal sac triangulation.  There is moderate to significant neural foraminal narrowing demonstrated bilaterally right slightly more so than left with suspected exiting nerve root  contact.      Assessment:   The patient is a 71-year-old man with a history of CAD on aspirin, nephrolithiasis, hypothyroidism who presents in referral from Dr. Mcneill for back and leg pain.    1. Lumbar radiculopathy  Vital signs    Verify informed consent    Notify physician     Notify physician     Notify physician (specify)    Diet NPO    Case Request Operating Room: Injection,steroid,epidural,transforaminal approach L45, L5/S1    Place in Outpatient    alprazolam ODT dissolvable tablet 1 mg      2. Spinal stenosis of lumbar region without neurogenic claudication        3. DDD (degenerative disc disease), lumbar        4. Lumbar spondylosis              Plan:  1. We discussed his pain and symptoms today.  I believe his right-sided radicular pain has returned.  This pain is limiting his mobility and interfering with his ADLs.  2. Would like to schedule him for a repeat right L4/5 and L5/S1 transforaminal GIANNA.  His most recent injection was in May 2022 and he found excellent relief from this injection lasting 3 months.  3. Follow-up 4 weeks post procedure or sooner if needed.

## 2022-09-02 NOTE — H&P (VIEW-ONLY)
This note was completed with dictation software and grammatical errors may exist.    CC:  Right leg pain    HPI:  The patient is a 71-year-old man with a history of CAD on aspirin, nephrolithiasis, hypothyroidism who presents in referral from Dr. Mcneill for back and leg pain.  Today he is reporting return of his lower back pain with radiation down his right leg.  He states the pain is similar to the past with on the lateral aspect of his right leg.  He rates his pain as a 4-8/10, worse with activities such as standing and walking for extended periods of time.  His last injection was a right-sided L4/5 and L5/S1 transforaminal GIANNA on 05/27/2022 with excellent relief of his pain lasting over 3 months.  Today he denies any associated numbness weakness or changes with his bowel or bladder function.        Previous history:  The patient reports that about 6 months ago he began having right posterior thigh pain.  He states that it was not severe at 1st but has been worsening.  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  At 1 time he was also having bilateral foot numbness but this has resolved.  He states that the pain is worse the longer he has been sitting and when he goes to stand up.  He denies any pain with walking and in fact he walks 2 miles every morning and feels better with this.  He states that it is particularly worse with prolonged sitting such as when driving.  He denies any pain in the back or in his left leg.  He denies any weakness.    Pain intervention history:     He is status post L5/S1 interlaminar epidural steroid injection on 10/29/2021 with 50% relief.   He is status post L5/S1 interlaminar epidural steroid injection to the right on 01/14/2022 with 100% relief lasting about 2 weeks, now reporting 65-70% relief.    He is status post L5/S1 interlaminar  epidural steroid injection to the right on 03/11/2022 with almost 100% relief until yesterday.  Status post right-sided L4/5 and L5/S1 transforaminal GIANNA on 05/27/2022 with excellent relief of his pain lasting over 3 months.    Spine surgeries:  None    Antineuropathics:  NSAIDs:  Physical therapy:  He has done physical therapy for over a month at [facility]  Antidepressants:  Muscle relaxers:  Opioids:  Antiplatelets/Anticoagulants:  Aspirin 325    ROS:  He reports leg pain only.  Balance of review of systems is negative.    No results found for: LABA1C, HGBA1C    Lab Results   Component Value Date    WBC 6.67 03/17/2022    HGB 13.7 (L) 03/17/2022    HCT 43.1 03/17/2022    MCV 94 03/17/2022     03/17/2022             Past Medical History:   Diagnosis Date    Coronary artery disease involving native coronary artery of native heart without angina pectoris 03/20/2019    Left heart cath 9/10/2007: 30% mid-LAD, 60% ostial D1, LCX with 40% mid, RCA with 30% mid-stenosis, 100% PLB.    Diverticulosis, sigmoid     MILD     Gout     Hyperlipidemia     Hypertension     Hypothyroidism     Lentigo maligna 02/26/2019    Personal history of colonic polyps 01/22/2019       Past Surgical History:   Procedure Laterality Date    ANGIOGRAM, CORONARY, WITH LEFT HEART CATHETERIZATION      COLONOSCOPY  01/22/2019    repeat will be determined by biopsy results of polyps removed     EPIDURAL STEROID INJECTION INTO LUMBAR SPINE N/A 10/29/2021    Procedure: Injection-steroid-epidural-lumbar L5/S1;  Surgeon: Frank Pressley MD;  Location: [facility] OR;  Service: Pain Management;  Laterality: N/A;    EPIDURAL STEROID INJECTION INTO LUMBAR SPINE N/A 01/14/2022    Procedure: Injection-steroid-epidural-lumbar L5/S1 to Right;  Surgeon: Frank Pressley MD;  Location: [facility] OR;  Service: Pain Management;  Laterality: N/A;    EPIDURAL STEROID INJECTION INTO LUMBAR SPINE N/A 03/11/2022    Procedure: Injection-steroid-epidural-lumbar L5/S1 to  Right;  Surgeon: Frank Smith MD;  Location: [facility] OR;  Service: Pain Management;  Laterality: N/A;    spinal injections       per dr smith   3 of them last one 2 weeks ago    TONSILLECTOMY      TRANSFORAMINAL EPIDURAL INJECTION OF STEROID Right 5/27/2022    Procedure: Injection,steroid,epidural,transforaminal approach L4/5 L5/S1;  Surgeon: Frank Smith MD;  Location: [facility] OR;  Service: Pain Management;  Laterality: Right;       Social History     Socioeconomic History    Marital status:      Spouse name: ivana   Tobacco Use    Smoking status: Never    Smokeless tobacco: Never   Substance and Sexual Activity    Alcohol use: Yes     Alcohol/week: 1.0 standard drink     Types: 1 Shots of liquor per week    Drug use: Never    Sexual activity: Yes     Partners: Female     Birth control/protection: None         Medications/Allergies: See med card    Vitals:    09/02/22 1431   BP: (!) 99/53   Pulse: 84   SpO2: 95%   Weight: 101.4 kg (223 lb 7 oz)   Height: 6' (1.829 m)   PainSc: 0-No pain         Physical exam:  Gen: A and O x3, pleasant, well-groomed  Skin: No rashes or obvious lesions  HEENT: PERRLA, no obvious deformities on ears or in canals. Trachea midline.  CVS: Regular rate and rhythm, normal palpable pulses.  Resp:No increased work of breathing, symmetrical chest rise.  Abdomen: Soft, NT/ND.  Musculoskeletal: Able to heel walk, toe walk. No antalgic gait.     Neuro:  Lower extremities: 5/5 strength bilaterally  Reflexes:   Patellar 2+, Achilles 2+ bilaterally.  Sensory:  Intact and symmetrical to light touch and pinprick in C2-T1 and L2-S1 dermatomes bilaterally.      Lumbar spine:  Lumbar spine: ROM is full with flexion extension and oblique extension with no increased pain.    Ang's test causes no increased pain on either side.    Supine straight leg raise is negative bilaterally.    Internal and external rotation of the hip causes no increased pain on either side.  Myofascial exam:  No tenderness to palpation across lumbar paraspinous muscles.      Imagin21 MRI L-spine:  The conus ends at T12-L1.  No diffuse abnormal marrow or central conus signal is appreciated. No paraspinal fluid collections are appreciated. Osseous alignment appears maintained. There is some straightening of the curvature overall.  This may be positional versus muscular spasm.  There appears to be some incidental slight superior bladder wall thickening although may be exaggerated with underdistention.  There is some subcentimeter renal cystic averaging demonstrated.  There is decreased disc water signal overall throughout the lumbar spine with spurring and endplate degeneration.  There is posterior element, ligamentous hypertrophy with mid to lower lumbar distribution predominance.  No extruded  type fragment is currently appreciated.   L1-2 demonstrates broad-based concentric disc bulging with narrowing and annular fissuring.  There is mild lateral recess, inferior neural foraminal narrowing along with thecal sac triangulation.  L2-3 demonstrates broad-based concentric disc bulging with some thecal sac triangulation and annular fissuring.  There is mild to moderate neural foraminal narrowing demonstrated bilaterally.  There appears to be some lateral protrusion appearance more so on the right.   L3-4 demonstrates broad-based concentric disc bulging with thecal sac triangulation along with moderate neural foraminal narrowing bilaterally.   L4-5 demonstrates concentric disc bulging with thecal sac triangulation, narrowing and annular fissuring.  There is moderate to significant neural foraminal narrowing demonstrated bilaterally.   L5-S1 demonstrates broad-based concentric disc bulging with annular fissuring and thecal sac triangulation.  There is moderate to significant neural foraminal narrowing demonstrated bilaterally right slightly more so than left with suspected exiting nerve root  contact.      Assessment:   The patient is a 71-year-old man with a history of CAD on aspirin, nephrolithiasis, hypothyroidism who presents in referral from Dr. Mcneill for back and leg pain.    1. Lumbar radiculopathy  Vital signs    Verify informed consent    Notify physician     Notify physician     Notify physician (specify)    Diet NPO    Case Request Operating Room: Injection,steroid,epidural,transforaminal approach L45, L5/S1    Place in Outpatient    alprazolam ODT dissolvable tablet 1 mg      2. Spinal stenosis of lumbar region without neurogenic claudication        3. DDD (degenerative disc disease), lumbar        4. Lumbar spondylosis              Plan:  1. We discussed his pain and symptoms today.  I believe his right-sided radicular pain has returned.  This pain is limiting his mobility and interfering with his ADLs.  2. Would like to schedule him for a repeat right L4/5 and L5/S1 transforaminal GIANNA.  His most recent injection was in May 2022 and he found excellent relief from this injection lasting 3 months.  3. Follow-up 4 weeks post procedure or sooner if needed.

## 2022-09-23 ENCOUNTER — HOSPITAL ENCOUNTER (OUTPATIENT)
Facility: HOSPITAL | Age: 72
Discharge: HOME | End: 2022-09-23
Attending: ANESTHESIOLOGY | Admitting: ANESTHESIOLOGY
Payer: MEDICARE

## 2022-09-23 ENCOUNTER — HOSPITAL ENCOUNTER (OUTPATIENT)
Dept: RADIOLOGY | Facility: HOSPITAL | Age: 72
Discharge: HOME | End: 2022-09-23
Attending: ANESTHESIOLOGY
Payer: MEDICARE

## 2022-09-23 VITALS
HEIGHT: 72 IN | WEIGHT: 223 LBS | RESPIRATION RATE: 16 BRPM | DIASTOLIC BLOOD PRESSURE: 68 MMHG | BODY MASS INDEX: 30.2 KG/M2 | TEMPERATURE: 98 F | HEART RATE: 75 BPM | OXYGEN SATURATION: 99 % | SYSTOLIC BLOOD PRESSURE: 122 MMHG

## 2022-09-23 DIAGNOSIS — M54.16 LUMBAR RADICULOPATHY: ICD-10-CM

## 2022-09-23 DIAGNOSIS — M54.50 LOWER BACK PAIN: ICD-10-CM

## 2022-09-23 PROCEDURE — 64484 NJX AA&/STRD TFRM EPI L/S EA: CPT | Mod: PO | Performed by: ANESTHESIOLOGY

## 2022-09-23 PROCEDURE — 64484 NJX AA&/STRD TFRM EPI L/S EA: CPT | Mod: RT,,, | Performed by: ANESTHESIOLOGY

## 2022-09-23 PROCEDURE — 64484 PRA INJECT ANES/STEROID FORAMEN LUMBAR/SACRAL W IMG GUIDE ,EA ADD LEVEL: ICD-10-PCS | Mod: RT,,, | Performed by: ANESTHESIOLOGY

## 2022-09-23 PROCEDURE — 25000003 PHARM REV CODE 250: Mod: PO | Performed by: ANESTHESIOLOGY

## 2022-09-23 PROCEDURE — 76000 FLUOROSCOPY <1 HR PHYS/QHP: CPT | Mod: TC,PO

## 2022-09-23 PROCEDURE — 64483 NJX AA&/STRD TFRM EPI L/S 1: CPT | Mod: RT,,, | Performed by: ANESTHESIOLOGY

## 2022-09-23 PROCEDURE — 64483 NJX AA&/STRD TFRM EPI L/S 1: CPT | Mod: PO | Performed by: ANESTHESIOLOGY

## 2022-09-23 PROCEDURE — 25500020 PHARM REV CODE 255: Mod: PO | Performed by: ANESTHESIOLOGY

## 2022-09-23 PROCEDURE — 64483 PR EPIDURAL INJ, ANES/STEROID, TRANSFORAMINAL, LUMB/SACR, SNGL LEVL: ICD-10-PCS | Mod: RT,,, | Performed by: ANESTHESIOLOGY

## 2022-09-23 PROCEDURE — 63600175 PHARM REV CODE 636 W HCPCS: Mod: PO | Performed by: ANESTHESIOLOGY

## 2022-09-23 RX ORDER — LIDOCAINE HYDROCHLORIDE 10 MG/ML
INJECTION, SOLUTION EPIDURAL; INFILTRATION; INTRACAUDAL; PERINEURAL
Status: DISCONTINUED | OUTPATIENT
Start: 2022-09-23 | End: 2022-09-23 | Stop reason: HOSPADM

## 2022-09-23 RX ORDER — METHYLPREDNISOLONE ACETATE 80 MG/ML
INJECTION, SUSPENSION INTRA-ARTICULAR; INTRALESIONAL; INTRAMUSCULAR; SOFT TISSUE
Status: DISCONTINUED | OUTPATIENT
Start: 2022-09-23 | End: 2022-09-23 | Stop reason: HOSPADM

## 2022-09-23 RX ORDER — BUPIVACAINE HYDROCHLORIDE 2.5 MG/ML
INJECTION, SOLUTION EPIDURAL; INFILTRATION; INTRACAUDAL
Status: DISCONTINUED | OUTPATIENT
Start: 2022-09-23 | End: 2022-09-23 | Stop reason: HOSPADM

## 2022-09-23 RX ORDER — ALPRAZOLAM 0.5 MG/1
1 TABLET, ORALLY DISINTEGRATING ORAL ONCE AS NEEDED
Status: COMPLETED | OUTPATIENT
Start: 2022-09-23 | End: 2022-09-23

## 2022-09-23 RX ADMIN — ALPRAZOLAM 1 MG: 0.5 TABLET, ORALLY DISINTEGRATING ORAL at 01:09

## 2022-09-23 NOTE — OP NOTE
PROCEDURE DATE: 9/23/2022    PROCEDURE:right L4/5 and L5/S1 transforaminal epidural steroid injection under fluoroscopy    DIAGNOSIS: Lumbar  Radiculopathy    Post op diagnosis: Same    PHYSICIAN: Frank Pressley MD    MEDICATIONS INJECTED:  Methylprednisolone 40mg (1ml) and 1ml 0.25% bupivicaine at each nerve root.     LOCAL ANESTHETIC INJECTED:  Lidocaine 1%. 4 ml per site.    SEDATION MEDICATIONS: none    ESTIMATED BLOOD LOSS:  none    COMPLICATIONS:  none    TECHNIQUE:   A time-out was taken to identify patient and procedure side prior to starting the procedure. The patient was placed in a prone position, prepped and draped in the usual sterile fashion using ChloraPrep and sterile towels.  The area to be injected was determined under fluoroscopic guidance in AP and oblique view.  Local anesthetic was given by raising a wheal and going down to the hub of a 25-gauge 1.5 inch needle.  In oblique view, a 5 inch 22-gauge bent-tip spinal needle was introduced towards 6 oclock position of the pedicle of each above named nerve root level.  The needle was walked medially then hinged into the neural foramen and position was confirmed in AP and lateral views.  Omnipaque contrast dye was injected to confirm appropriate placement and that there was no vascular uptake.  After negative aspiration for blood or CSF, the medication was then injected. This was performed at the right L4/5 and L5/S1 level(s). The patient tolerated the procedure well.    The patient was monitored after the procedure.  Patient was given post procedure and discharge instructions to follow at home. The patient was discharged in a stable condition.

## 2022-09-23 NOTE — DISCHARGE SUMMARY
[facility] - Surgery  Discharge Note  Short Stay    Procedure(s) (LRB):  Injection,steroid,epidural,transforaminal approach L45, L5/S1 (Right)    OUTCOME: Patient tolerated treatment/procedure well without complication and is now ready for discharge.    DISPOSITION: Home or Self Care    FINAL DIAGNOSIS:  Lumbar radiculopathy    FOLLOWUP: In clinic    DISCHARGE INSTRUCTIONS:    Discharge Procedure Orders   Diet Adult Regular     No dressing needed     Notify your health care provider if you experience any of the following:  temperature >100.4     Activity as tolerated

## 2022-09-24 PROBLEM — Z79.82 LONG TERM (CURRENT) USE OF ASPIRIN: Chronic | Status: ACTIVE | Noted: 2020-08-31

## 2022-09-24 PROBLEM — I70.0 THORACIC AORTA ATHEROSCLEROSIS: Chronic | Status: ACTIVE | Noted: 2022-03-21

## 2022-09-24 PROBLEM — N20.0 NEPHROLITHIASIS: Chronic | Status: ACTIVE | Noted: 2020-08-31

## 2022-09-24 PROBLEM — Z79.899 ON STATIN THERAPY: Chronic | Status: ACTIVE | Noted: 2020-08-31

## 2022-09-24 PROBLEM — M54.16 LUMBAR RADICULOPATHY: Chronic | Status: ACTIVE | Noted: 2021-10-29

## 2022-09-24 PROBLEM — N40.0 BENIGN PROSTATIC HYPERPLASIA WITHOUT LOWER URINARY TRACT SYMPTOMS: Chronic | Status: ACTIVE | Noted: 2020-08-31

## 2022-10-21 ENCOUNTER — OFFICE VISIT (OUTPATIENT)
Dept: PAIN MEDICINE | Facility: CLINIC | Age: 72
End: 2022-10-21
Payer: MEDICARE

## 2022-10-21 VITALS
SYSTOLIC BLOOD PRESSURE: 118 MMHG | HEIGHT: 72 IN | HEART RATE: 72 BPM | OXYGEN SATURATION: 97 % | WEIGHT: 221.25 LBS | DIASTOLIC BLOOD PRESSURE: 58 MMHG | BODY MASS INDEX: 29.97 KG/M2

## 2022-10-21 DIAGNOSIS — M51.36 DDD (DEGENERATIVE DISC DISEASE), LUMBAR: ICD-10-CM

## 2022-10-21 DIAGNOSIS — M48.061 SPINAL STENOSIS OF LUMBAR REGION WITHOUT NEUROGENIC CLAUDICATION: ICD-10-CM

## 2022-10-21 DIAGNOSIS — M54.16 LUMBAR RADICULOPATHY: Primary | ICD-10-CM

## 2022-10-21 PROCEDURE — 1101F PR PT FALLS ASSESS DOC 0-1 FALLS W/OUT INJ PAST YR: ICD-10-PCS | Mod: CPTII,S$GLB,,

## 2022-10-21 PROCEDURE — 3288F FALL RISK ASSESSMENT DOCD: CPT | Mod: CPTII,S$GLB,,

## 2022-10-21 PROCEDURE — 99999 PR PBB SHADOW E&M-EST. PATIENT-LVL III: CPT | Mod: PBBFAC,,,

## 2022-10-21 PROCEDURE — 1126F AMNT PAIN NOTED NONE PRSNT: CPT | Mod: CPTII,S$GLB,,

## 2022-10-21 PROCEDURE — 3074F PR MOST RECENT SYSTOLIC BLOOD PRESSURE < 130 MM HG: ICD-10-PCS | Mod: CPTII,S$GLB,,

## 2022-10-21 PROCEDURE — 1159F PR MEDICATION LIST DOCUMENTED IN MEDICAL RECORD: ICD-10-PCS | Mod: CPTII,S$GLB,,

## 2022-10-21 PROCEDURE — 99213 PR OFFICE/OUTPT VISIT, EST, LEVL III, 20-29 MIN: ICD-10-PCS | Mod: S$GLB,,,

## 2022-10-21 PROCEDURE — 3078F PR MOST RECENT DIASTOLIC BLOOD PRESSURE < 80 MM HG: ICD-10-PCS | Mod: CPTII,S$GLB,,

## 2022-10-21 PROCEDURE — 1101F PT FALLS ASSESS-DOCD LE1/YR: CPT | Mod: CPTII,S$GLB,,

## 2022-10-21 PROCEDURE — 1126F PR PAIN SEVERITY QUANTIFIED, NO PAIN PRESENT: ICD-10-PCS | Mod: CPTII,S$GLB,,

## 2022-10-21 PROCEDURE — 99999 PR PBB SHADOW E&M-EST. PATIENT-LVL III: ICD-10-PCS | Mod: PBBFAC,,,

## 2022-10-21 PROCEDURE — 3288F PR FALLS RISK ASSESSMENT DOCUMENTED: ICD-10-PCS | Mod: CPTII,S$GLB,,

## 2022-10-21 PROCEDURE — 3074F SYST BP LT 130 MM HG: CPT | Mod: CPTII,S$GLB,,

## 2022-10-21 PROCEDURE — 1159F MED LIST DOCD IN RCRD: CPT | Mod: CPTII,S$GLB,,

## 2022-10-21 PROCEDURE — 99213 OFFICE O/P EST LOW 20 MIN: CPT | Mod: S$GLB,,,

## 2022-10-21 PROCEDURE — 3078F DIAST BP <80 MM HG: CPT | Mod: CPTII,S$GLB,,

## 2022-10-21 NOTE — PROGRESS NOTES
This note was completed with dictation software and grammatical errors may exist.    CC:  Right leg pain    HPI:  The patient is a 71-year-old man with a history of CAD on aspirin, nephrolithiasis, hypothyroidism who presents in referral from Dr. Mcneill for back and leg pain. He is s/p right L4/5 and L5/S1 GIANNA on 9/23/2022 with 100% relief of his previous right sided pain. Overall he is satisfied with the relief of the GIANNA. He denies any new numbness weakness or any changes to his bowel or bladder function.     Previous history:  The patient reports that about 6 months ago he began having right posterior thigh pain.  He states that it was not severe at 1st but has been worsening.  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  At 1 time he was also having bilateral foot numbness but this has resolved.  He states that the pain is worse the longer he has been sitting and when he goes to stand up.  He denies any pain with walking and in fact he walks 2 miles every morning and feels better with this.  He states that it is particularly worse with prolonged sitting such as when driving.  He denies any pain in the back or in his left leg.  He denies any weakness.    Pain intervention history:     He is status post L5/S1 interlaminar epidural steroid injection on 10/29/2021 with 50% relief.   He is status post L5/S1 interlaminar epidural steroid injection to the right on 01/14/2022 with 100% relief lasting about 2 weeks, now reporting 65-70% relief.    He is status post L5/S1 interlaminar epidural steroid injection to the right on 03/11/2022 with almost 100% relief until yesterday.  Status post right-sided L4/5 and L5/S1 transforaminal GIANNA on 05/27/2022 with excellent relief of his pain lasting over 3 months. He is s/p right L4/5 and L5/S1 GIANNA on 9/23/2022 with 100% relief of his  previous right sided pain.     Spine surgeries:  None    Antineuropathics:  NSAIDs:  Physical therapy:  He has done physical therapy for over a month at [facility]  Antidepressants:  Muscle relaxers:  Opioids:  Antiplatelets/Anticoagulants:  Aspirin 325    ROS:  He reports leg pain only.  Balance of review of systems is negative.    No results found for: LABA1C, HGBA1C    Lab Results   Component Value Date    WBC 5.90 09/16/2022    HGB 13.8 (L) 09/16/2022    HCT 43.1 09/16/2022    MCV 94 09/16/2022     09/16/2022             Past Medical History:   Diagnosis Date    Coronary artery disease involving native coronary artery of native heart without angina pectoris 03/20/2019    Left heart cath 9/10/2007: 30% mid-LAD, 60% ostial D1, LCX with 40% mid, RCA with 30% mid-stenosis, 100% PLB.    Diverticulosis, sigmoid     MILD     Gout     Hyperlipidemia     Hypertension     Hypothyroidism     Lentigo maligna 02/26/2019    Personal history of colonic polyps 01/22/2019       Past Surgical History:   Procedure Laterality Date    ANGIOGRAM, CORONARY, WITH LEFT HEART CATHETERIZATION      COLONOSCOPY  01/22/2019    repeat will be determined by biopsy results of polyps removed     EPIDURAL STEROID INJECTION INTO LUMBAR SPINE N/A 10/29/2021    Procedure: Injection-steroid-epidural-lumbar L5/S1;  Surgeon: Frank Pressley MD;  Location: [facility] OR;  Service: Pain Management;  Laterality: N/A;    EPIDURAL STEROID INJECTION INTO LUMBAR SPINE N/A 01/14/2022    Procedure: Injection-steroid-epidural-lumbar L5/S1 to Right;  Surgeon: Frank Pressley MD;  Location: [facility] OR;  Service: Pain Management;  Laterality: N/A;    EPIDURAL STEROID INJECTION INTO LUMBAR SPINE N/A 03/11/2022    Procedure: Injection-steroid-epidural-lumbar L5/S1 to Right;  Surgeon: Frank Pressley MD;  Location: [facility] OR;  Service: Pain Management;  Laterality: N/A;    EPIDURAL STEROID INJECTION INTO LUMBAR SPINE  09/23/2022    L4 L5 S1,    Dr. Marie  Huan    spinal injections       per dr smith   3 of them last one 2 weeks ago    TONSILLECTOMY      TRANSFORAMINAL EPIDURAL INJECTION OF STEROID Right 05/27/2022    Procedure: Injection,steroid,epidural,transforaminal approach L4/5 L5/S1;  Surgeon: Frank Smith MD;  Location: [facility] OR;  Service: Pain Management;  Laterality: Right;    TRANSFORAMINAL EPIDURAL INJECTION OF STEROID Right 9/23/2022    Procedure: Injection,steroid,epidural,transforaminal approach L45, L5/S1;  Surgeon: Frank Smith MD;  Location: [facility] OR;  Service: Pain Management;  Laterality: Right;       Social History     Socioeconomic History    Marital status:      Spouse name: ivana   Tobacco Use    Smoking status: Never    Smokeless tobacco: Never   Substance and Sexual Activity    Alcohol use: Yes     Alcohol/week: 1.0 standard drink     Types: 1 Shots of liquor per week    Drug use: Never    Sexual activity: Yes     Partners: Female     Birth control/protection: None         Medications/Allergies: See med card    Vitals:    10/21/22 1311   BP: (!) 118/58   Pulse: 72   SpO2: 97%   Weight: 100.4 kg (221 lb 3.7 oz)   Height: 6' (1.829 m)   PainSc: 0-No pain         Physical exam:  Gen: A and O x3, pleasant, well-groomed  Skin: No rashes or obvious lesions  HEENT: PERRLA, no obvious deformities on ears or in canals. Trachea midline.  CVS: Regular rate and rhythm, normal palpable pulses.  Resp:No increased work of breathing, symmetrical chest rise.  Abdomen: Soft, NT/ND.  Musculoskeletal: Able to heel walk, toe walk. No antalgic gait.     Neuro:  Lower extremities: 5/5 strength bilaterally  Reflexes:   Patellar 2+, Achilles 2+ bilaterally.  Sensory:  Intact and symmetrical to light touch and pinprick in  L2-S1 dermatomes bilaterally.      Lumbar spine:  Lumbar spine: ROM is full with flexion extension and oblique extension with no increased pain.    Ang's test causes no increased pain on either side.    Supine straight  leg raise is negative bilaterally.    Internal and external rotation of the hip causes no increased pain on either side.  Myofascial exam: No tenderness to palpation across lumbar paraspinous muscles.      Imagin21 MRI L-spine:  The conus ends at T12-L1.  No diffuse abnormal marrow or central conus signal is appreciated. No paraspinal fluid collections are appreciated. Osseous alignment appears maintained. There is some straightening of the curvature overall.  This may be positional versus muscular spasm.  There appears to be some incidental slight superior bladder wall thickening although may be exaggerated with underdistention.  There is some subcentimeter renal cystic averaging demonstrated.  There is decreased disc water signal overall throughout the lumbar spine with spurring and endplate degeneration.  There is posterior element, ligamentous hypertrophy with mid to lower lumbar distribution predominance.  No extruded  type fragment is currently appreciated.   L1-2 demonstrates broad-based concentric disc bulging with narrowing and annular fissuring.  There is mild lateral recess, inferior neural foraminal narrowing along with thecal sac triangulation.  L2-3 demonstrates broad-based concentric disc bulging with some thecal sac triangulation and annular fissuring.  There is mild to moderate neural foraminal narrowing demonstrated bilaterally.  There appears to be some lateral protrusion appearance more so on the right.   L3-4 demonstrates broad-based concentric disc bulging with thecal sac triangulation along with moderate neural foraminal narrowing bilaterally.   L4-5 demonstrates concentric disc bulging with thecal sac triangulation, narrowing and annular fissuring.  There is moderate to significant neural foraminal narrowing demonstrated bilaterally.   L5-S1 demonstrates broad-based concentric disc bulging with annular fissuring and thecal sac triangulation.  There is moderate to significant  neural foraminal narrowing demonstrated bilaterally right slightly more so than left with suspected exiting nerve root contact.      Assessment:   The patient is a 71-year-old man with a history of CAD on aspirin, nephrolithiasis, hypothyroidism who presents in referral from Dr. Mcneill for back and leg pain.    1. Lumbar radiculopathy        2. Spinal stenosis of lumbar region without neurogenic claudication        3. DDD (degenerative disc disease), lumbar                Plan:  1. We discussed his pain and symptoms today.    2. He did very well following this GIANNA. He reports this may be his best response to GIANNA. We discussed if this doesn't provide him with lasting relief we may need to consider seeing neurosurgery, he was agreeable.   3. Exercises for lower back provided today in clinic.   4. Follow up as needed.

## 2022-11-25 PROBLEM — R07.9 CHEST PAIN: Status: ACTIVE | Noted: 2022-11-25

## 2022-11-25 PROBLEM — I21.4 NSTEMI (NON-ST ELEVATED MYOCARDIAL INFARCTION): Status: ACTIVE | Noted: 2022-11-25

## 2023-02-27 PROBLEM — I21.4 NSTEMI (NON-ST ELEVATED MYOCARDIAL INFARCTION): Status: RESOLVED | Noted: 2022-11-25 | Resolved: 2023-02-27

## 2023-07-07 ENCOUNTER — OFFICE VISIT (OUTPATIENT)
Dept: PAIN MEDICINE | Facility: CLINIC | Age: 73
End: 2023-07-07
Payer: MEDICARE

## 2023-07-07 VITALS
WEIGHT: 178.56 LBS | SYSTOLIC BLOOD PRESSURE: 109 MMHG | HEIGHT: 72 IN | DIASTOLIC BLOOD PRESSURE: 55 MMHG | BODY MASS INDEX: 24.18 KG/M2 | HEART RATE: 65 BPM

## 2023-07-07 DIAGNOSIS — M54.16 LUMBAR RADICULOPATHY: Primary | ICD-10-CM

## 2023-07-07 DIAGNOSIS — M48.061 SPINAL STENOSIS OF LUMBAR REGION WITHOUT NEUROGENIC CLAUDICATION: ICD-10-CM

## 2023-07-07 DIAGNOSIS — M51.36 DDD (DEGENERATIVE DISC DISEASE), LUMBAR: ICD-10-CM

## 2023-07-07 DIAGNOSIS — M47.816 LUMBAR SPONDYLOSIS: ICD-10-CM

## 2023-07-07 PROCEDURE — 3288F FALL RISK ASSESSMENT DOCD: CPT | Mod: CPTII,S$GLB,, | Performed by: ANESTHESIOLOGY

## 2023-07-07 PROCEDURE — 99213 OFFICE O/P EST LOW 20 MIN: CPT | Mod: S$GLB,,, | Performed by: ANESTHESIOLOGY

## 2023-07-07 PROCEDURE — 3078F PR MOST RECENT DIASTOLIC BLOOD PRESSURE < 80 MM HG: ICD-10-PCS | Mod: CPTII,S$GLB,, | Performed by: ANESTHESIOLOGY

## 2023-07-07 PROCEDURE — 99999 PR PBB SHADOW E&M-EST. PATIENT-LVL III: ICD-10-PCS | Mod: PBBFAC,,, | Performed by: ANESTHESIOLOGY

## 2023-07-07 PROCEDURE — 1159F PR MEDICATION LIST DOCUMENTED IN MEDICAL RECORD: ICD-10-PCS | Mod: CPTII,S$GLB,, | Performed by: ANESTHESIOLOGY

## 2023-07-07 PROCEDURE — 3008F PR BODY MASS INDEX (BMI) DOCUMENTED: ICD-10-PCS | Mod: CPTII,S$GLB,, | Performed by: ANESTHESIOLOGY

## 2023-07-07 PROCEDURE — 1126F PR PAIN SEVERITY QUANTIFIED, NO PAIN PRESENT: ICD-10-PCS | Mod: CPTII,S$GLB,, | Performed by: ANESTHESIOLOGY

## 2023-07-07 PROCEDURE — 99213 PR OFFICE/OUTPT VISIT, EST, LEVL III, 20-29 MIN: ICD-10-PCS | Mod: S$GLB,,, | Performed by: ANESTHESIOLOGY

## 2023-07-07 PROCEDURE — 1126F AMNT PAIN NOTED NONE PRSNT: CPT | Mod: CPTII,S$GLB,, | Performed by: ANESTHESIOLOGY

## 2023-07-07 PROCEDURE — 99999 PR PBB SHADOW E&M-EST. PATIENT-LVL III: CPT | Mod: PBBFAC,,, | Performed by: ANESTHESIOLOGY

## 2023-07-07 PROCEDURE — 1101F PR PT FALLS ASSESS DOC 0-1 FALLS W/OUT INJ PAST YR: ICD-10-PCS | Mod: CPTII,S$GLB,, | Performed by: ANESTHESIOLOGY

## 2023-07-07 PROCEDURE — 3288F PR FALLS RISK ASSESSMENT DOCUMENTED: ICD-10-PCS | Mod: CPTII,S$GLB,, | Performed by: ANESTHESIOLOGY

## 2023-07-07 PROCEDURE — 3078F DIAST BP <80 MM HG: CPT | Mod: CPTII,S$GLB,, | Performed by: ANESTHESIOLOGY

## 2023-07-07 PROCEDURE — 4010F PR ACE/ARB THEARPY RXD/TAKEN: ICD-10-PCS | Mod: CPTII,S$GLB,, | Performed by: ANESTHESIOLOGY

## 2023-07-07 PROCEDURE — 3074F SYST BP LT 130 MM HG: CPT | Mod: CPTII,S$GLB,, | Performed by: ANESTHESIOLOGY

## 2023-07-07 PROCEDURE — 1159F MED LIST DOCD IN RCRD: CPT | Mod: CPTII,S$GLB,, | Performed by: ANESTHESIOLOGY

## 2023-07-07 PROCEDURE — 3074F PR MOST RECENT SYSTOLIC BLOOD PRESSURE < 130 MM HG: ICD-10-PCS | Mod: CPTII,S$GLB,, | Performed by: ANESTHESIOLOGY

## 2023-07-07 PROCEDURE — 3008F BODY MASS INDEX DOCD: CPT | Mod: CPTII,S$GLB,, | Performed by: ANESTHESIOLOGY

## 2023-07-07 PROCEDURE — 4010F ACE/ARB THERAPY RXD/TAKEN: CPT | Mod: CPTII,S$GLB,, | Performed by: ANESTHESIOLOGY

## 2023-07-07 PROCEDURE — 1101F PT FALLS ASSESS-DOCD LE1/YR: CPT | Mod: CPTII,S$GLB,, | Performed by: ANESTHESIOLOGY

## 2023-07-07 RX ORDER — TETANUS TOXOID, REDUCED DIPHTHERIA TOXOID AND ACELLULAR PERTUSSIS VACCINE, ADSORBED 5; 2.5; 8; 8; 2.5 [IU]/.5ML; [IU]/.5ML; UG/.5ML; UG/.5ML; UG/.5ML
SUSPENSION INTRAMUSCULAR
COMMUNITY
Start: 2023-04-15 | End: 2023-10-04

## 2023-07-07 NOTE — PROGRESS NOTES
This note was completed with dictation software and grammatical errors may exist.    CC:  Right leg pain    HPI:  The patient is a 72-year-old man with a history of CAD on aspirin, nephrolithiasis, hypothyroidism who presents in referral from Dr. Mcneill for back and leg pain.  The patient returns in follow-up today, since the last time I would seen him he had some fatigue, ended up needing several cardiac stents.  In his last stent was the 1st week of January, 2023.  He had done cardiac rehab but then also continued this, has ended up losing about 40 pounds and feels that he is doing fairly well with this weight loss.  He is continuing to exercise, has been playing golf without any major issues.  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  This happened several times and it was severe.  This has improved however.        Previous history:  The patient reports that about 6 months ago he began having right posterior thigh pain.  He states that it was not severe at 1st but has been worsening.  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  At 1 time he was also having bilateral foot numbness but this has resolved.  He states that the pain is worse the longer he has been sitting and when he goes to stand up.  He denies any pain with walking and in fact he walks 2 miles every morning and feels better with this.  He states that it is particularly worse with prolonged sitting such as when driving.  He denies any pain in the back or in his left leg.  He denies any weakness.    Pain intervention history:     He is status post L5/S1 interlaminar epidural steroid injection on 10/29/2021 with 50% relief.   He is status post L5/S1 interlaminar epidural steroid injection to the  right on 01/14/2022 with 100% relief lasting about 2 weeks, now reporting 65-70% relief.    He is status post L5/S1 interlaminar epidural steroid injection to the right on 03/11/2022 with almost 100% relief until yesterday.  Status post right-sided L4/5 and L5/S1 transforaminal GIANNA on 05/27/2022 with excellent relief of his pain lasting over 3 months. He is s/p right L4/5 and L5/S1 GIANNA on 9/23/2022 with 100% relief of his previous right sided pain.     Spine surgeries:  None    Antineuropathics:  NSAIDs:  Physical therapy:  He has done physical therapy for over a month at [facility]  Antidepressants:  Muscle relaxers:  Opioids:  Antiplatelets/Anticoagulants:  Aspirin 325    ROS:  He reports leg pain only.  Balance of review of systems is negative.    No results found for: LABA1C, HGBA1C    Lab Results   Component Value Date    WBC 6.67 01/03/2023    HGB 13.3 (L) 01/03/2023    HCT 40.7 01/03/2023    MCV 94 01/03/2023     01/03/2023             Past Medical History:   Diagnosis Date    Anticoagulant long-term use     Coronary artery disease involving native coronary artery of native heart without angina pectoris 03/20/2019    Left heart cath 9/10/2007: 30% mid-LAD, 60% ostial D1, LCX with 40% mid, RCA with 30% mid-stenosis, 100% PLB.    Diverticulosis, sigmoid     MILD     Gout     Hyperlipidemia     Hypertension     Hypothyroidism     Ischemic cardiomyopathy 12/2022    Lentigo maligna 02/26/2019    Personal history of colonic polyps 01/22/2019       Past Surgical History:   Procedure Laterality Date    ANGIOGRAM, CORONARY, WITH LEFT HEART CATHETERIZATION      ANGIOGRAM, CORONARY, WITH LEFT HEART CATHETERIZATION N/A 11/25/2022    Procedure: ANGIOGRAM,CORONARY,WITH LEFT HEART CATHETERIZATION;  Surgeon: Katiuska Paredes MD;  Location: [facility] CATH;  Service: Cardiology;  Laterality: N/A;    ATHERECTOMY, CORONARY N/A 1/3/2023    Procedure: Atherectomy-coronary;  Surgeon: Dario Shirley MD;  Location: [facility]  CATH;  Service: Cardiovascular;  Laterality: N/A;    COLONOSCOPY  01/22/2019    repeat will be determined by biopsy results of polyps removed     CORONARY ANGIOGRAPHY N/A 1/3/2023    Procedure: ANGIOGRAM, CORONARY ARTERY;  Surgeon: Dario Shirley MD;  Location: [facility] CATH;  Service: Cardiovascular;  Laterality: N/A;    EPIDURAL STEROID INJECTION INTO LUMBAR SPINE N/A 10/29/2021    Procedure: Injection-steroid-epidural-lumbar L5/S1;  Surgeon: Frank Pressley MD;  Location: [facility] OR;  Service: Pain Management;  Laterality: N/A;    EPIDURAL STEROID INJECTION INTO LUMBAR SPINE N/A 01/14/2022    Procedure: Injection-steroid-epidural-lumbar L5/S1 to Right;  Surgeon: Frank Pressley MD;  Location: [facility] OR;  Service: Pain Management;  Laterality: N/A;    EPIDURAL STEROID INJECTION INTO LUMBAR SPINE N/A 03/11/2022    Procedure: Injection-steroid-epidural-lumbar L5/S1 to Right;  Surgeon: Frank Pressley MD;  Location: [facility] OR;  Service: Pain Management;  Laterality: N/A;    EPIDURAL STEROID INJECTION INTO LUMBAR SPINE  09/23/2022    L4 L5 S1,    Dr. Frank Pressley    INSERTION OF TEMPORARY PACEMAKER N/A 1/3/2023    Procedure: INSERTION, PACEMAKER, TEMPORARY;  Surgeon: Dario Shirley MD;  Location: [facility] CATH;  Service: Cardiovascular;  Laterality: N/A;    IVUS, CORONARY  1/3/2023    Procedure: IVUS, Coronary;  Surgeon: Dario Shirley MD;  Location: [facility] CATH;  Service: Cardiovascular;;    spinal injections       per dr pressley   3 of them last one 2 weeks ago    STENT, DRUG ELUTING, SINGLE VESSEL, CORONARY  1/3/2023    Procedure: Stent, Drug Eluting, Single Vessel, Coronary;  Surgeon: Dario Shirley MD;  Location: [facility] CATH;  Service: Cardiovascular;;    TONSILLECTOMY      TRANSFORAMINAL EPIDURAL INJECTION OF STEROID Right 05/27/2022    Procedure: Injection,steroid,epidural,transforaminal approach L4/5 L5/S1;  Surgeon: Frank Pressley MD;  Location: [facility] OR;  Service:  Pain Management;  Laterality: Right;    TRANSFORAMINAL EPIDURAL INJECTION OF STEROID Right 9/23/2022    Procedure: Injection,steroid,epidural,transforaminal approach L45, L5/S1;  Surgeon: Frank Pressley MD;  Location: [facility];  Service: Pain Management;  Laterality: Right;       Social History     Socioeconomic History    Marital status:      Spouse name: ivana   Tobacco Use    Smoking status: Never     Passive exposure: Never    Smokeless tobacco: Never   Substance and Sexual Activity    Alcohol use: Yes     Alcohol/week: 1.0 standard drink     Types: 1 Shots of liquor per week     Comment: occasional    Drug use: Never    Sexual activity: Yes     Partners: Female     Birth control/protection: None     Social Determinants of Health     Financial Resource Strain: Low Risk     Difficulty of Paying Living Expenses: Not hard at all   Food Insecurity: No Food Insecurity    Worried About Running Out of Food in the Last Year: Never true    Ran Out of Food in the Last Year: Never true   Transportation Needs: No Transportation Needs    Lack of Transportation (Medical): No    Lack of Transportation (Non-Medical): No   Physical Activity: Insufficiently Active    Days of Exercise per Week: 4 days    Minutes of Exercise per Session: 30 min   Stress: No Stress Concern Present    Feeling of Stress : Not at all   Social Connections: Unknown    Frequency of Communication with Friends and Family: More than three times a week    Frequency of Social Gatherings with Friends and Family: Three times a week    Active Member of Clubs or Organizations: No    Attends Club or Organization Meetings: Never    Marital Status:    Housing Stability: Unknown    Unable to Pay for Housing in the Last Year: No    Unstable Housing in the Last Year: No         Medications/Allergies: See med card    Vitals:    07/07/23 0909   BP: (!) 109/55   Pulse: 65   Weight: 81 kg (178 lb 9.2 oz)   Height: 6' (1.829 m)   PainSc: 0-No pain          Physical exam:  Gen: A and O x3, pleasant, well-groomed  Skin: No rashes or obvious lesions  HEENT: PERRLA, no obvious deformities on ears or in canals. Trachea midline.  CVS: Regular rate and rhythm, normal palpable pulses.  Resp:No increased work of breathing, symmetrical chest rise.  Abdomen: Soft, NT/ND.  Musculoskeletal: Able to heel walk, toe walk. No antalgic gait.     Neuro:  Lower extremities: 5/5 strength bilaterally  Reflexes:   Patellar 2+, Achilles 2+ bilaterally.  Sensory:  Intact and symmetrical to light touch and pinprick in  L2-S1 dermatomes bilaterally.      Lumbar spine:  Lumbar spine: ROM is full with flexion extension and oblique extension with no increased pain.    Ang's test causes no increased pain on either side.    Supine straight leg raise is negative bilaterally.    Internal and external rotation of the hip causes no increased pain on either side.  Myofascial exam: No tenderness to palpation across lumbar paraspinous muscles.      Imagin21 MRI L-spine:  The conus ends at T12-L1.  No diffuse abnormal marrow or central conus signal is appreciated. No paraspinal fluid collections are appreciated. Osseous alignment appears maintained. There is some straightening of the curvature overall.  This may be positional versus muscular spasm.  There appears to be some incidental slight superior bladder wall thickening although may be exaggerated with underdistention.  There is some subcentimeter renal cystic averaging demonstrated.  There is decreased disc water signal overall throughout the lumbar spine with spurring and endplate degeneration.  There is posterior element, ligamentous hypertrophy with mid to lower lumbar distribution predominance.  No extruded  type fragment is currently appreciated.   L1-2 demonstrates broad-based concentric disc bulging with narrowing and annular fissuring.  There is mild lateral recess, inferior neural foraminal narrowing along with  thecal sac triangulation.  L2-3 demonstrates broad-based concentric disc bulging with some thecal sac triangulation and annular fissuring.  There is mild to moderate neural foraminal narrowing demonstrated bilaterally.  There appears to be some lateral protrusion appearance more so on the right.   L3-4 demonstrates broad-based concentric disc bulging with thecal sac triangulation along with moderate neural foraminal narrowing bilaterally.   L4-5 demonstrates concentric disc bulging with thecal sac triangulation, narrowing and annular fissuring.  There is moderate to significant neural foraminal narrowing demonstrated bilaterally.   L5-S1 demonstrates broad-based concentric disc bulging with annular fissuring and thecal sac triangulation.  There is moderate to significant neural foraminal narrowing demonstrated bilaterally right slightly more so than left with suspected exiting nerve root contact.      Assessment:   The patient is a 72-year-old man with a history of CAD on aspirin, nephrolithiasis, hypothyroidism who presents in referral from Dr. Mcneill for back and leg pain.    1. Lumbar radiculopathy        2. Spinal stenosis of lumbar region without neurogenic claudication        3. DDD (degenerative disc disease), lumbar        4. Lumbar spondylosis            Plan:  1. He is currently doing okay but we discussed that if he has worsened pain, we can always set up an oral or intramuscular steroid injection if things become severe and have not improved.  We discussed also setting up physical therapy if needed.  If he needs any kind of injections, we would have to wait until January of next year.  Otherwise he is doing well.

## 2023-09-30 PROBLEM — D70.9 NEUTROPENIA, UNSPECIFIED TYPE: Status: ACTIVE | Noted: 2023-09-30

## 2023-10-09 ENCOUNTER — TELEPHONE (OUTPATIENT)
Dept: HEMATOLOGY/ONCOLOGY | Facility: CLINIC | Age: 73
End: 2023-10-09
Payer: MEDICARE

## 2023-10-09 NOTE — TELEPHONE ENCOUNTER
Attempt to return pt call and LVM.   Pt has benign hem referral in Epic.     Pt returned call and scheduled benign hem appt from referral.   Scheduled first available on Oct 17th, confirmed the appt date time and location w/ pt.

## 2023-10-09 NOTE — TELEPHONE ENCOUNTER
----- Message from Vidya Morocho, Patient Care Assistant sent at 10/6/2023  2:33 PM CDT -----  Type: Needs Medical Advice  Who Called:  tate Stallings Call Back Number: [phone]    Additional Information: tate is ready to schedule with Cornelius VALLE from referral , please call to further  discuss, thank you

## 2023-10-11 ENCOUNTER — TELEPHONE (OUTPATIENT)
Dept: HEMATOLOGY/ONCOLOGY | Facility: CLINIC | Age: 73
End: 2023-10-11
Payer: MEDICARE

## 2023-10-11 NOTE — TELEPHONE ENCOUNTER
Dr Ramon, Hem had a km change and pt's appt time will need to be moved.   Called pt and he was ok moving appt from 9:40 to 9:30 and confirmed.

## 2023-10-16 NOTE — PROGRESS NOTES
[facility] - NEW PATIENT VISIT    Reason for visit: Establish Care     Best Contact Phone Number(s): [phone] (home)      Pancytopenia  10/2023: Referral for WBC 3.07, nrml differential, Hgb 11.3, MCV 98, plts 146k.     Normocytic Anemia  2007: Normocytic anemia has been noted with Hgb in 11 range     HPI: Jeffrey Schoen is a 72 y.o. male with CAD and hypothyroidism who presents for evaluation of pancytopenia. Patient has had recent significant dietary and lifestyle modifications since MI after Thanksgiving 2022. He had one stent placed and two native vessels. He underwent intensive cardiac rehab through 4/2023 and subsequently joined [employer] and now exercising 5-6x / week. He remained active this summer playing golf, and occasionally experienced lightheadedness in the heat. He stays busy with work as a  and is often working into late evening, impacting insomnia. He does note throughout recent health changes that his nutrition was poor, and he often ate very minimal. He and his wife are now focusing on more well rounded nutritious meals. Patient also ddid have a recent upper respiratory infection with symptoms lasting ~30 days after taking steroids + antibiotic.     Patient presents with wife, Sharon.  ECOG PS is 0.    History has been obtained by chart review and discussion with the patient.    ROS:   A complete 12-point review of systems was reviewed and is negative except as mentioned above.     Past Medical History:   Past Medical History:   Diagnosis Date    Anticoagulant long-term use     Coronary artery disease involving native coronary artery of native heart without angina pectoris 03/20/2019    Left heart cath 9/10/2007: 30% mid-LAD, 60% ostial D1, LCX with 40% mid, RCA with 30% mid-stenosis, 100% PLB.    Diverticulosis, sigmoid     MILD     Gout     Hyperlipidemia     Hypertension     Hypothyroidism     Ischemic cardiomyopathy 12/2022    Lentigo maligna 02/26/2019     Personal history of colonic polyps 01/22/2019        Past Surgical History:   Past Surgical History:   Procedure Laterality Date    ANGIOGRAM, CORONARY, WITH LEFT HEART CATHETERIZATION      ANGIOGRAM, CORONARY, WITH LEFT HEART CATHETERIZATION N/A 11/25/2022    Procedure: ANGIOGRAM,CORONARY,WITH LEFT HEART CATHETERIZATION;  Surgeon: Katiuska Paredes MD;  [facility];  Service: Cardiology;  Laterality: N/A;    ATHERECTOMY, CORONARY N/A 1/3/2023    Procedure: Atherectomy-coronary;  Surgeon: Dario Shirley MD;  [facility];  Service: Cardiovascular;  Laterality: N/A;    COLONOSCOPY  01/22/2019    repeat will be determined by biopsy results of polyps removed     CORONARY ANGIOGRAPHY N/A 1/3/2023    Procedure: ANGIOGRAM, CORONARY ARTERY;  Surgeon: Dario Shirley MD;  Location: [facility] CATH;  Service: Cardiovascular;  Laterality: N/A;    EPIDURAL STEROID INJECTION INTO LUMBAR SPINE N/A 10/29/2021    Procedure: Injection-steroid-epidural-lumbar L5/S1;  Surgeon: Frank Pressley MD;  Location: [facility] OR;  Service: Pain Management;  Laterality: N/A;    EPIDURAL STEROID INJECTION INTO LUMBAR SPINE N/A 01/14/2022    Procedure: Injection-steroid-epidural-lumbar L5/S1 to Right;  Surgeon: Frank Pressley MD;  Location: [facility] OR;  Service: Pain Management;  Laterality: N/A;    EPIDURAL STEROID INJECTION INTO LUMBAR SPINE N/A 03/11/2022    Procedure: Injection-steroid-epidural-lumbar L5/S1 to Right;  Surgeon: Frank Pressley MD;  Location: [facility] OR;  Service: Pain Management;  Laterality: N/A;    EPIDURAL STEROID INJECTION INTO LUMBAR SPINE  09/23/2022    L4 L5 S1,    Dr. Frank Pressley    INSERTION OF TEMPORARY PACEMAKER N/A 1/3/2023    Procedure: INSERTION, PACEMAKER, TEMPORARY;  Surgeon: Dario Shirley MD;  Location: [facility] CATH;  Service: Cardiovascular;  Laterality: N/A;    IVUS, CORONARY  1/3/2023    Procedure: IVUS, Coronary;  Surgeon: Dario Shirley MD;   Location: [facility] CATH;  Service: Cardiovascular;;    spinal injections       per dr smith   3 of them last one 2 weeks ago    STENT, DRUG ELUTING, SINGLE VESSEL, CORONARY  1/3/2023    Procedure: Stent, Drug Eluting, Single Vessel, Coronary;  Surgeon: Dario Shirley MD;  Location: [facility] CATH;  Service: Cardiovascular;;    TONSILLECTOMY      TRANSFORAMINAL EPIDURAL INJECTION OF STEROID Right 05/27/2022    Procedure: Injection,steroid,epidural,transforaminal approach L4/5 L5/S1;  Surgeon: Frank Smith MD;  Location: [facility] OR;  Service: Pain Management;  Laterality: Right;    TRANSFORAMINAL EPIDURAL INJECTION OF STEROID Right 9/23/2022    Procedure: Injection,steroid,epidural,transforaminal approach L45, L5/S1;  Surgeon: Frank Smith MD;  Location: [facility] OR;  Service: Pain Management;  Laterality: Right;        Family History:   Family History   Problem Relation Age of Onset    Cancer Mother         breast    Breast cancer Mother     Heart disease Father         stroke, bypass    Stroke Father         Social History:   Social History     Tobacco Use    Smoking status: Never     Passive exposure: Never    Smokeless tobacco: Never   Substance Use Topics    Alcohol use: Yes     Alcohol/week: 1.0 standard drink of alcohol     Types: 1 Shots of liquor per week     Comment: occasional        I have reviewed and updated the patient's past medical, surgical, family and social histories.    Allergies:   Review of patient's allergies indicates:  No Known Allergies     Medications:   Current Outpatient Medications   Medication Sig Dispense Refill    alfuzosin (UROXATRAL) 10 mg Tb24 TAKE 1 TABLET (10 MG TOTAL) BY MOUTH DAILY WITH BREAKFAST. 30 tablet 9    allopurinoL (ZYLOPRIM) 300 MG tablet TAKE 1 TABLET (300 MG TOTAL) BY MOUTH DAILY. 30 tablet 6    aspirin (ECOTRIN) 81 MG EC tablet Take 1 tablet (81 mg total) by mouth once daily.  0    BRILINTA 90 mg tablet take 1 tablet by mouth 2 times daily **do not stop  unless instructed by md** 60 tablet 6    levothyroxine (SYNTHROID) 75 MCG tablet Take 75 mcg by mouth before breakfast.      losartan (COZAAR) 25 MG tablet Take 1 tablet (25 mg total) by mouth once daily. (Patient taking differently: Take 25 mg by mouth once daily. Takes with the 50 mg dose) 90 tablet 3    potassium citrate (UROCIT-K) 10 mEq (1,080 mg) TbSR TAKE ONE TABLET (10 MEQ TOTAL) BY MOUTH TWICE A DAY WITH MEALS. 60 tablet 11    pravastatin (PRAVACHOL) 80 MG tablet take 1 tablet by mouth every evening 30 tablet 6    MIRALAX 17 gram/dose powder Take by mouth.       No current facility-administered medications for this visit.        Physical Exam:   /80 (BP Location: Right arm, Patient Position: Sitting, BP Method: Medium (Manual))   Pulse (!) 51   Temp 96.7 °F (35.9 °C) (Temporal)   Resp 16   Ht 6' (1.829 m)   Wt 76 kg (167 lb 8.8 oz)   SpO2 100%   BMI 22.72 kg/m²                Physical Exam  Constitutional:       Appearance: Normal appearance.   HENT:      Head: Normocephalic and atraumatic.      Mouth/Throat:      Mouth: Mucous membranes are moist.   Eyes:      Extraocular Movements: Extraocular movements intact.      Pupils: Pupils are equal, round, and reactive to light.   Cardiovascular:      Rate and Rhythm: Normal rate and regular rhythm.      Pulses: Normal pulses.      Heart sounds: No murmur heard.  Pulmonary:      Effort: Pulmonary effort is normal. No respiratory distress.      Breath sounds: Normal breath sounds.   Abdominal:      General: Abdomen is flat. There is no distension.      Palpations: Abdomen is soft.      Tenderness: There is no abdominal tenderness.   Musculoskeletal:         General: No swelling or tenderness. Normal range of motion.      Cervical back: Normal range of motion. No rigidity.   Skin:     General: Skin is warm and dry.      Coloration: Skin is not jaundiced.   Neurological:      General: No focal deficit present.      Mental Status: He is alert and  oriented to person, place, and time.   Psychiatric:         Mood and Affect: Mood normal.         Behavior: Behavior normal.           Labs:   Lab Results   Component Value Date    WBC 3.07 (L) 10/03/2023    HGB 11.3 (L) 10/03/2023    HCT 35.7 (L) 10/03/2023    MCV 98 10/03/2023     (L) 10/03/2023       Lab Results   Component Value Date     09/27/2023    K 4.0 09/27/2023     09/27/2023    CO2 29 09/27/2023    BUN 19 09/27/2023    CREATININE 0.97 09/27/2023    ALBUMIN 3.5 09/27/2023    BILITOT 1.0 09/27/2023    ALKPHOS 85 09/27/2023    AST 36 09/27/2023    ALT 61 (H) 09/27/2023       Imaging:   CT Abdomen Pelvis With Contrast  Narrative: EXAMINATION:  CT ABDOMEN PELVIS WITH CONTRAST    CLINICAL HISTORY:  Hematuria, gross/macroscopic; Other specified abnormal findings of blood chemistry. Low red blood cells.  In recent labs, microscopic hematuria, and abnormal weight loss. Hx anemia, hx heart attacks, no ca, no sx.    TECHNIQUE:  Axial CT images were obtained through the abdomen and pelvis following IV administration of 80 mL of Omnipaque 350 contrast. Oral contrast was given. Coronal and sagittal reconstructions were submitted and interpreted. Dose reduction techniques including automatic exposure control (AEC) were utilized.    Dose (DLP): 563 mGycm    COMPARISON:  Abdominal radiograph 09/20/2023.    FINDINGS:  Lung Bases: 4 mm pleural based juxtapleural ground-glass nodule in the left lower lobe (series 8, image 59).  Per Fleischner criteria, no follow-up is required.  No consolidation or pleural effusion.    Heart: Normal in size. No pericardial effusion.    Liver: Normal size.  Slightly heterogeneous early enhancement with normal appearance on delayed postcontrast images.    Gallbladder: No calcified gallstones.    Bile Ducts: No evidence of dilated ducts.    Pancreas: No mass or peripancreatic fat stranding.    Spleen: Unremarkable.    Adrenals: Unremarkable.    Kidneys/ Ureters: Multiple  bilateral renal cysts including left parapelvic cysts.  No follow-up is recommended for incidental simple renal cysts as they are likely benign.  Multiple bilateral renal stones with the largest on the left measuring 1.4 cm (series 8, image 161).  No hydronephrosis. The ureters are normal in course and caliber.    Bladder: No evidence of wall thickening.    Reproductive organs: Prominent seminal vesicles bilaterally.  Prostatomegaly.    GI Tract/Mesentery: The stomach, and small bowel are normal in appearance.  The appendix is not identified but no pericecal inflammatory changes evident.    Peritoneal Space: No ascites. No free air.    Retroperitoneum: No significant adenopathy.    Abdominal wall: Unremarkable.    Vasculature: Mild atherosclerotic calcifications.  No aneurysm.    Bones: No acute fracture or aggressive-appearing lytic or blastic lesion.  Multilevel lumbar spondylosis.  Impression: 1. Bilateral nonobstructing renal stones with no hydronephrosis or hydroureter.  2. Nonspecific prominence of the seminal vesicles.  Prostatomegaly.  3. Additional findings as above.    Electronically signed by: Shabbir Omalley MD  Date:    10/03/2023  Time:    09:44              Assessment:       1. Anemia, unspecified type    2. Neutropenia, unspecified type    3. Elevation of levels of liver transaminase levels    4. Hematuria, unspecified type          Plan:           # Pancytopenia -  Today we had a long discussion regarding etiology of pancytopenia particularly in regards to decreased bone marrow production. This can be due to nutritional factors, recent illnesses or medications, all of which hold true in patient's case. He has had recent significant weight loss with dietary modification and exercise, recent URI and abx, as well as initiation of DAPT. While he has a longstanding anemia with new onset of neutropenia and slight thrombocytopenia, there are no abnormal cell populations raising concern for malignancy.  Prefer to first optimize nutrition and evaluate for nutritional deficiency. Will also check viral serology, as well as coagulation studies and reticulocyte count. We discussed that if cell counts do not improve in next 3 months bone marrow biopsy may be next diagnostic step, but is not indicated at this time based off of peripheral blood findings and clinical history  - all labs today    # CAD - MI after Thanksgiving 11/2022 + PCI in one artery. Followed by dietary and exercise modifications. Taking Losartan 25 mg, ASA + Brilinta.     # Hematuria - Notes occasional red tinge to urine, may be attributed to DAPT. History of nephrolithiasis, denies retroperitoneal pain and last KUB shows multiple bilateral renal stones. Will touch base with Dr. Prater regarding >100 RBC on 9/27/23 urinalysis.     # Irregular Bowel Mvmts - Upcoming endoscopies scheduled and for colon CA screening.    # RHM  - Weight loss 215 -> 165 lbs, intentional. Recent PSA with Dr. Prater normal. Screening CT scheduled 10/21 after recent URI.    Follow up: 3 months    The above information has been reviewed with the patient and all questions have been answered to their apparent satisfaction.  They understand that they can call the clinic with any questions.    Mackenzie Alcaraz MD  Hematology/Oncology  [facility]      Med Onc Chart Routing      Follow up with physician 3 months. kieran 1/2023 with cbc prior   Follow up with MACKENZIE    Infusion scheduling note    Injection scheduling note    Labs CBC and other   Scheduling:  Preferred lab:  Lab interval:  today labs (all alcaraz labs + iron, b12, ferritin, folate from dr. min)   Imaging    Pharmacy appointment    Other referrals

## 2023-10-17 ENCOUNTER — LAB VISIT (OUTPATIENT)
Dept: LAB | Facility: HOSPITAL | Age: 73
End: 2023-10-17
Attending: INTERNAL MEDICINE
Payer: MEDICARE

## 2023-10-17 ENCOUNTER — OFFICE VISIT (OUTPATIENT)
Dept: HEMATOLOGY/ONCOLOGY | Facility: CLINIC | Age: 73
End: 2023-10-17
Payer: MEDICARE

## 2023-10-17 VITALS
BODY MASS INDEX: 22.69 KG/M2 | DIASTOLIC BLOOD PRESSURE: 80 MMHG | SYSTOLIC BLOOD PRESSURE: 120 MMHG | HEIGHT: 72 IN | HEART RATE: 51 BPM | TEMPERATURE: 97 F | OXYGEN SATURATION: 100 % | WEIGHT: 167.56 LBS | RESPIRATION RATE: 16 BRPM

## 2023-10-17 DIAGNOSIS — D64.9 ANEMIA, UNSPECIFIED TYPE: Primary | ICD-10-CM

## 2023-10-17 DIAGNOSIS — R91.1 PULMONARY NODULE: ICD-10-CM

## 2023-10-17 DIAGNOSIS — D69.6 THROMBOCYTOPENIA: ICD-10-CM

## 2023-10-17 DIAGNOSIS — R31.9 HEMATURIA, UNSPECIFIED TYPE: ICD-10-CM

## 2023-10-17 DIAGNOSIS — R74.01 ELEVATION OF LEVELS OF LIVER TRANSAMINASE LEVELS: ICD-10-CM

## 2023-10-17 DIAGNOSIS — D70.9 NEUTROPENIA, UNSPECIFIED TYPE: ICD-10-CM

## 2023-10-17 DIAGNOSIS — D64.9 ANEMIA, UNSPECIFIED TYPE: ICD-10-CM

## 2023-10-17 DIAGNOSIS — R63.4 WEIGHT LOSS: ICD-10-CM

## 2023-10-17 LAB
APTT PPP: 29.1 SEC (ref 21–32)
BASOPHILS # BLD AUTO: 0.04 K/UL (ref 0–0.2)
BASOPHILS NFR BLD: 0.9 % (ref 0–1.9)
DIFFERENTIAL METHOD: ABNORMAL
EOSINOPHIL # BLD AUTO: 0.1 K/UL (ref 0–0.5)
EOSINOPHIL NFR BLD: 1.4 % (ref 0–8)
ERYTHROCYTE [DISTWIDTH] IN BLOOD BY AUTOMATED COUNT: 15.6 % (ref 11.5–14.5)
FERRITIN SERPL-MCNC: 112 NG/ML (ref 20–300)
FOLATE SERPL-MCNC: 9.4 NG/ML (ref 4–24)
HAV IGM SERPL QL IA: NORMAL
HBV CORE IGM SERPL QL IA: NORMAL
HBV SURFACE AG SERPL QL IA: NORMAL
HCT VFR BLD AUTO: 36 % (ref 40–54)
HCV AB SERPL QL IA: NORMAL
HGB BLD-MCNC: 11.3 G/DL (ref 14–18)
HIV 1+2 AB+HIV1 P24 AG SERPL QL IA: NORMAL
IMM GRANULOCYTES # BLD AUTO: 0.01 K/UL (ref 0–0.04)
IMM GRANULOCYTES NFR BLD AUTO: 0.2 % (ref 0–0.5)
INR PPP: 1.1 (ref 0.8–1.2)
IRON SERPL-MCNC: 104 UG/DL (ref 45–160)
LYMPHOCYTES # BLD AUTO: 0.6 K/UL (ref 1–4.8)
LYMPHOCYTES NFR BLD: 14.5 % (ref 18–48)
MCH RBC QN AUTO: 30.9 PG (ref 27–31)
MCHC RBC AUTO-ENTMCNC: 31.4 G/DL (ref 32–36)
MCV RBC AUTO: 98 FL (ref 82–98)
MONOCYTES # BLD AUTO: 0.4 K/UL (ref 0.3–1)
MONOCYTES NFR BLD: 9.8 % (ref 4–15)
NEUTROPHILS # BLD AUTO: 3.2 K/UL (ref 1.8–7.7)
NEUTROPHILS NFR BLD: 73.2 % (ref 38–73)
NRBC BLD-RTO: 0 /100 WBC
PATH REV BLD -IMP: NORMAL
PLATELET # BLD AUTO: 154 K/UL (ref 150–450)
PMV BLD AUTO: 9.5 FL (ref 9.2–12.9)
PROTHROMBIN TIME: 11.3 SEC (ref 9–12.5)
RBC # BLD AUTO: 3.66 M/UL (ref 4.6–6.2)
RETICS/RBC NFR AUTO: 1.1 % (ref 0.4–2)
SATURATED IRON: 30 % (ref 20–50)
TOTAL IRON BINDING CAPACITY: 349 UG/DL (ref 250–450)
TRANSFERRIN SERPL-MCNC: 236 MG/DL (ref 200–375)
TSH SERPL DL<=0.005 MIU/L-ACNC: 2.11 UIU/ML (ref 0.4–4)
VIT B12 SERPL-MCNC: 478 PG/ML (ref 210–950)
WBC # BLD AUTO: 4.4 K/UL (ref 3.9–12.7)

## 2023-10-17 PROCEDURE — 82728 ASSAY OF FERRITIN: CPT | Performed by: INTERNAL MEDICINE

## 2023-10-17 PROCEDURE — 1101F PR PT FALLS ASSESS DOC 0-1 FALLS W/OUT INJ PAST YR: ICD-10-PCS | Mod: CPTII,S$GLB,, | Performed by: STUDENT IN AN ORGANIZED HEALTH CARE EDUCATION/TRAINING PROGRAM

## 2023-10-17 PROCEDURE — 99205 PR OFFICE/OUTPT VISIT, NEW, LEVL V, 60-74 MIN: ICD-10-PCS | Mod: S$GLB,,, | Performed by: STUDENT IN AN ORGANIZED HEALTH CARE EDUCATION/TRAINING PROGRAM

## 2023-10-17 PROCEDURE — 82525 ASSAY OF COPPER: CPT | Performed by: STUDENT IN AN ORGANIZED HEALTH CARE EDUCATION/TRAINING PROGRAM

## 2023-10-17 PROCEDURE — 1126F AMNT PAIN NOTED NONE PRSNT: CPT | Mod: CPTII,S$GLB,, | Performed by: STUDENT IN AN ORGANIZED HEALTH CARE EDUCATION/TRAINING PROGRAM

## 2023-10-17 PROCEDURE — 1159F MED LIST DOCD IN RCRD: CPT | Mod: CPTII,S$GLB,, | Performed by: STUDENT IN AN ORGANIZED HEALTH CARE EDUCATION/TRAINING PROGRAM

## 2023-10-17 PROCEDURE — 99999 PR PBB SHADOW E&M-EST. PATIENT-LVL IV: ICD-10-PCS | Mod: PBBFAC,,, | Performed by: STUDENT IN AN ORGANIZED HEALTH CARE EDUCATION/TRAINING PROGRAM

## 2023-10-17 PROCEDURE — 85730 THROMBOPLASTIN TIME PARTIAL: CPT | Performed by: STUDENT IN AN ORGANIZED HEALTH CARE EDUCATION/TRAINING PROGRAM

## 2023-10-17 PROCEDURE — 84443 ASSAY THYROID STIM HORMONE: CPT | Performed by: STUDENT IN AN ORGANIZED HEALTH CARE EDUCATION/TRAINING PROGRAM

## 2023-10-17 PROCEDURE — 85060 BLOOD SMEAR INTERPRETATION: CPT | Mod: ,,, | Performed by: PATHOLOGY

## 2023-10-17 PROCEDURE — 85045 AUTOMATED RETICULOCYTE COUNT: CPT | Mod: PN | Performed by: STUDENT IN AN ORGANIZED HEALTH CARE EDUCATION/TRAINING PROGRAM

## 2023-10-17 PROCEDURE — 1126F PR PAIN SEVERITY QUANTIFIED, NO PAIN PRESENT: ICD-10-PCS | Mod: CPTII,S$GLB,, | Performed by: STUDENT IN AN ORGANIZED HEALTH CARE EDUCATION/TRAINING PROGRAM

## 2023-10-17 PROCEDURE — 4010F PR ACE/ARB THEARPY RXD/TAKEN: ICD-10-PCS | Mod: CPTII,S$GLB,, | Performed by: STUDENT IN AN ORGANIZED HEALTH CARE EDUCATION/TRAINING PROGRAM

## 2023-10-17 PROCEDURE — 1159F PR MEDICATION LIST DOCUMENTED IN MEDICAL RECORD: ICD-10-PCS | Mod: CPTII,S$GLB,, | Performed by: STUDENT IN AN ORGANIZED HEALTH CARE EDUCATION/TRAINING PROGRAM

## 2023-10-17 PROCEDURE — 80074 ACUTE HEPATITIS PANEL: CPT | Performed by: STUDENT IN AN ORGANIZED HEALTH CARE EDUCATION/TRAINING PROGRAM

## 2023-10-17 PROCEDURE — 3288F FALL RISK ASSESSMENT DOCD: CPT | Mod: CPTII,S$GLB,, | Performed by: STUDENT IN AN ORGANIZED HEALTH CARE EDUCATION/TRAINING PROGRAM

## 2023-10-17 PROCEDURE — 3288F PR FALLS RISK ASSESSMENT DOCUMENTED: ICD-10-PCS | Mod: CPTII,S$GLB,, | Performed by: STUDENT IN AN ORGANIZED HEALTH CARE EDUCATION/TRAINING PROGRAM

## 2023-10-17 PROCEDURE — 85610 PROTHROMBIN TIME: CPT | Performed by: STUDENT IN AN ORGANIZED HEALTH CARE EDUCATION/TRAINING PROGRAM

## 2023-10-17 PROCEDURE — 85060 PATHOLOGIST REVIEW: ICD-10-PCS | Mod: ,,, | Performed by: PATHOLOGY

## 2023-10-17 PROCEDURE — 82746 ASSAY OF FOLIC ACID SERUM: CPT | Performed by: INTERNAL MEDICINE

## 2023-10-17 PROCEDURE — 84466 ASSAY OF TRANSFERRIN: CPT | Performed by: INTERNAL MEDICINE

## 2023-10-17 PROCEDURE — 3008F PR BODY MASS INDEX (BMI) DOCUMENTED: ICD-10-PCS | Mod: CPTII,S$GLB,, | Performed by: STUDENT IN AN ORGANIZED HEALTH CARE EDUCATION/TRAINING PROGRAM

## 2023-10-17 PROCEDURE — 82607 VITAMIN B-12: CPT | Performed by: INTERNAL MEDICINE

## 2023-10-17 PROCEDURE — 3074F PR MOST RECENT SYSTOLIC BLOOD PRESSURE < 130 MM HG: ICD-10-PCS | Mod: CPTII,S$GLB,, | Performed by: STUDENT IN AN ORGANIZED HEALTH CARE EDUCATION/TRAINING PROGRAM

## 2023-10-17 PROCEDURE — 99205 OFFICE O/P NEW HI 60 MIN: CPT | Mod: S$GLB,,, | Performed by: STUDENT IN AN ORGANIZED HEALTH CARE EDUCATION/TRAINING PROGRAM

## 2023-10-17 PROCEDURE — 3079F PR MOST RECENT DIASTOLIC BLOOD PRESSURE 80-89 MM HG: ICD-10-PCS | Mod: CPTII,S$GLB,, | Performed by: STUDENT IN AN ORGANIZED HEALTH CARE EDUCATION/TRAINING PROGRAM

## 2023-10-17 PROCEDURE — 83540 ASSAY OF IRON: CPT | Performed by: INTERNAL MEDICINE

## 2023-10-17 PROCEDURE — 85025 COMPLETE CBC W/AUTO DIFF WBC: CPT | Mod: PN | Performed by: STUDENT IN AN ORGANIZED HEALTH CARE EDUCATION/TRAINING PROGRAM

## 2023-10-17 PROCEDURE — 3074F SYST BP LT 130 MM HG: CPT | Mod: CPTII,S$GLB,, | Performed by: STUDENT IN AN ORGANIZED HEALTH CARE EDUCATION/TRAINING PROGRAM

## 2023-10-17 PROCEDURE — 4010F ACE/ARB THERAPY RXD/TAKEN: CPT | Mod: CPTII,S$GLB,, | Performed by: STUDENT IN AN ORGANIZED HEALTH CARE EDUCATION/TRAINING PROGRAM

## 2023-10-17 PROCEDURE — 1101F PT FALLS ASSESS-DOCD LE1/YR: CPT | Mod: CPTII,S$GLB,, | Performed by: STUDENT IN AN ORGANIZED HEALTH CARE EDUCATION/TRAINING PROGRAM

## 2023-10-17 PROCEDURE — 87389 HIV-1 AG W/HIV-1&-2 AB AG IA: CPT | Performed by: STUDENT IN AN ORGANIZED HEALTH CARE EDUCATION/TRAINING PROGRAM

## 2023-10-17 PROCEDURE — 3008F BODY MASS INDEX DOCD: CPT | Mod: CPTII,S$GLB,, | Performed by: STUDENT IN AN ORGANIZED HEALTH CARE EDUCATION/TRAINING PROGRAM

## 2023-10-17 PROCEDURE — 3079F DIAST BP 80-89 MM HG: CPT | Mod: CPTII,S$GLB,, | Performed by: STUDENT IN AN ORGANIZED HEALTH CARE EDUCATION/TRAINING PROGRAM

## 2023-10-17 PROCEDURE — 99999 PR PBB SHADOW E&M-EST. PATIENT-LVL IV: CPT | Mod: PBBFAC,,, | Performed by: STUDENT IN AN ORGANIZED HEALTH CARE EDUCATION/TRAINING PROGRAM

## 2023-10-17 RX ORDER — POLYETHYLENE GLYCOL 3350 17 G/17G
POWDER, FOR SOLUTION ORAL
COMMUNITY
Start: 2023-10-10 | End: 2024-01-16

## 2023-10-17 NOTE — Clinical Note
Ted Singh - I saw Mr. Schoen today, you follow him closely with history of nephrolithiasis. He last had X ray 9/2023 which shows stable bilateral stones, but I wanted to bring to your attention recent UA showing >100 RBCs after you saw him. I suspect this is due to DAPT, and patient denies any pain, but wanted you to be aware. I am following for cytopenias likely related to recent lifestyle modifications. Let me know if any questions or concerns! Mackenzie

## 2023-10-17 NOTE — Clinical Note
Ted Mcneill - thank you for this referral. I think with recent significant lifestyle changes we will follow cbc in 3 months and work to optimize nutrition. I sent vitamin levels you ordered and a few other tests today. Be in touch. Mackenzie

## 2023-10-18 LAB — PATH REV BLD -IMP: NORMAL

## 2023-10-20 LAB — COPPER SERPL-MCNC: 848 UG/L (ref 665–1480)

## 2024-01-11 PROBLEM — D69.6 THROMBOCYTOPENIA: Status: ACTIVE | Noted: 2024-01-11

## 2024-01-12 ENCOUNTER — LAB VISIT (OUTPATIENT)
Dept: LAB | Facility: HOSPITAL | Age: 74
End: 2024-01-12
Attending: STUDENT IN AN ORGANIZED HEALTH CARE EDUCATION/TRAINING PROGRAM
Payer: MEDICARE

## 2024-01-12 DIAGNOSIS — D64.9 ANEMIA, UNSPECIFIED TYPE: ICD-10-CM

## 2024-01-12 LAB
BASOPHILS # BLD AUTO: 0.07 K/UL (ref 0–0.2)
BASOPHILS NFR BLD: 1.9 % (ref 0–1.9)
DIFFERENTIAL METHOD BLD: ABNORMAL
EOSINOPHIL # BLD AUTO: 0.1 K/UL (ref 0–0.5)
EOSINOPHIL NFR BLD: 3.1 % (ref 0–8)
ERYTHROCYTE [DISTWIDTH] IN BLOOD BY AUTOMATED COUNT: 14.2 % (ref 11.5–14.5)
HCT VFR BLD AUTO: 35 % (ref 40–54)
HGB BLD-MCNC: 11.4 G/DL (ref 14–18)
IMM GRANULOCYTES # BLD AUTO: 0.01 K/UL (ref 0–0.04)
IMM GRANULOCYTES NFR BLD AUTO: 0.3 % (ref 0–0.5)
LYMPHOCYTES # BLD AUTO: 0.9 K/UL (ref 1–4.8)
LYMPHOCYTES NFR BLD: 25.9 % (ref 18–48)
MCH RBC QN AUTO: 31.7 PG (ref 27–31)
MCHC RBC AUTO-ENTMCNC: 32.6 G/DL (ref 32–36)
MCV RBC AUTO: 97 FL (ref 82–98)
MONOCYTES # BLD AUTO: 0.4 K/UL (ref 0.3–1)
MONOCYTES NFR BLD: 9.7 % (ref 4–15)
NEUTROPHILS # BLD AUTO: 2.1 K/UL (ref 1.8–7.7)
NEUTROPHILS NFR BLD: 59.1 % (ref 38–73)
NRBC BLD-RTO: 0 /100 WBC
PLATELET # BLD AUTO: 157 K/UL (ref 150–450)
PMV BLD AUTO: 9.4 FL (ref 9.2–12.9)
RBC # BLD AUTO: 3.6 M/UL (ref 4.6–6.2)
WBC # BLD AUTO: 3.59 K/UL (ref 3.9–12.7)

## 2024-01-12 PROCEDURE — 85025 COMPLETE CBC W/AUTO DIFF WBC: CPT | Mod: PN | Performed by: STUDENT IN AN ORGANIZED HEALTH CARE EDUCATION/TRAINING PROGRAM

## 2024-01-12 PROCEDURE — 36415 COLL VENOUS BLD VENIPUNCTURE: CPT | Mod: PN | Performed by: STUDENT IN AN ORGANIZED HEALTH CARE EDUCATION/TRAINING PROGRAM

## 2024-01-16 ENCOUNTER — OFFICE VISIT (OUTPATIENT)
Dept: HEMATOLOGY/ONCOLOGY | Facility: CLINIC | Age: 74
End: 2024-01-16
Payer: MEDICARE

## 2024-01-16 DIAGNOSIS — I25.10 CORONARY ARTERY DISEASE INVOLVING NATIVE CORONARY ARTERY OF NATIVE HEART WITHOUT ANGINA PECTORIS: Chronic | ICD-10-CM

## 2024-01-16 DIAGNOSIS — D46.4 REFRACTORY ANEMIA, UNSPECIFIED: ICD-10-CM

## 2024-01-16 DIAGNOSIS — D61.818 PANCYTOPENIA: Primary | ICD-10-CM

## 2024-01-16 DIAGNOSIS — N20.0 NEPHROLITHIASIS: Chronic | ICD-10-CM

## 2024-01-16 DIAGNOSIS — Z00.00 ROUTINE HEALTH MAINTENANCE: ICD-10-CM

## 2024-01-16 DIAGNOSIS — R19.8 ABNORMAL BOWEL MOVEMENT: ICD-10-CM

## 2024-01-16 PROCEDURE — 99215 OFFICE O/P EST HI 40 MIN: CPT | Mod: 95,,, | Performed by: STUDENT IN AN ORGANIZED HEALTH CARE EDUCATION/TRAINING PROGRAM

## 2024-01-16 PROCEDURE — 1159F MED LIST DOCD IN RCRD: CPT | Mod: CPTII,95,, | Performed by: STUDENT IN AN ORGANIZED HEALTH CARE EDUCATION/TRAINING PROGRAM

## 2024-01-16 PROCEDURE — 4010F ACE/ARB THERAPY RXD/TAKEN: CPT | Mod: CPTII,95,, | Performed by: STUDENT IN AN ORGANIZED HEALTH CARE EDUCATION/TRAINING PROGRAM

## 2024-01-16 PROCEDURE — 1160F RVW MEDS BY RX/DR IN RCRD: CPT | Mod: CPTII,95,, | Performed by: STUDENT IN AN ORGANIZED HEALTH CARE EDUCATION/TRAINING PROGRAM

## 2024-01-16 RX ORDER — ALPRAZOLAM 0.5 MG/1
0.5 TABLET ORAL
Qty: 2 TABLET | Refills: 0 | Status: SHIPPED | OUTPATIENT
Start: 2024-01-16 | End: 2024-03-26 | Stop reason: ALTCHOICE

## 2024-01-16 NOTE — Clinical Note
Hi there - I wanted to let you know I am setting up a bone marrow biopsy for Mr. Schoen's leukopenia and anemia. I do not have a good nutritional cause, and since cytopenias are persistent I think it is best to evaluate now. I will certainly keep you posted after the procedure.   Chandrakant,  Mackenzie

## 2024-01-16 NOTE — PROGRESS NOTES
[facility]  FOLLOW-UP VISIT.     Reason for visit: follow up    Best Contact Phone Number(s): [phone] (home)      Pancytopenia  10/2023: Referral for WBC 3.07, nrml differential, Hgb 11.3, MCV 98, plts 146k. Nutritional studies replete, copper wnl, pathology interpretation shows mild anisopoikilocytosis      Normocytic Anemia  2007: Normocytic anemia has been noted with Hgb in 11 range     Interval History: Jeffrey Schoen is a 73 y.o. male with pancytopenia who presents for follow-up. Since last visit patient has maintained healthy lifestyle and feels better than ever. He continues daily exercise, maintains weight 156 - 160 lbs. 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 Denies symptoms of fatigue such as anemia or dyspnea on exertion.     Patient presents to clinic with wife Sharon.  ECOG Performance Status is 0.    ROS:  A complete 12-point review of systems was reviewed and is negative except as mentioned above.     Past Medical History:   Past Medical History:   Diagnosis Date    Anticoagulant long-term use     Coronary artery disease involving native coronary artery of native heart without angina pectoris 03/20/2019    Left heart cath 9/10/2007: 30% mid-LAD, 60% ostial D1, LCX with 40% mid, RCA with 30% mid-stenosis, 100% PLB.    Diverticulosis, sigmoid     MILD     Gout     Hyperlipidemia     Hypertension     Hypothyroidism     Ischemic cardiomyopathy 12/2022    Lentigo maligna 02/26/2019    Personal history of colonic polyps 01/22/2019        Allergies:   Review of patient's allergies indicates:  No Known Allergies     Medications:   Current Outpatient Medications   Medication Sig Dispense Refill    alfuzosin (UROXATRAL) 10 mg Tb24 TAKE 1 TABLET (10 MG TOTAL) BY MOUTH DAILY WITH BREAKFAST. 30 tablet 9    allopurinoL (ZYLOPRIM) 300 MG tablet take 1 tablet by mouth daily 30 tablet 6    aspirin (ECOTRIN) 81 MG EC tablet Take 1 tablet (81 mg total) by mouth once daily.  0    BRILINTA 90  mg tablet take 1 tablet by mouth 2 times daily **do not stop unless instructed by md** 60 tablet 6    levothyroxine (SYNTHROID) 50 MCG tablet Take 50 mcg by mouth before breakfast.      losartan (COZAAR) 50 MG tablet Take 50 mg by mouth once daily.      potassium citrate (UROCIT-K) 10 mEq (1,080 mg) TbSR TAKE ONE TABLET (10 MEQ TOTAL) BY MOUTH TWICE A DAY WITH MEALS. 60 tablet 11    pravastatin (PRAVACHOL) 80 MG tablet take 1 tablet by mouth every evening 30 tablet 6     No current facility-administered medications for this visit.        Physical Exam:   No physical exam, virtual visit     Labs:   Lab Results   Component Value Date    WBC 3.59 (L) 01/12/2024    HGB 11.4 (L) 01/12/2024    HCT 35.0 (L) 01/12/2024    MCV 97 01/12/2024     01/12/2024       Lab Results   Component Value Date     01/04/2024    K 4.3 01/04/2024     01/04/2024    CO2 28 01/04/2024    BUN 22 (H) 01/04/2024    CREATININE 0.99 01/04/2024    ALBUMIN 4.0 01/04/2024    BILITOT 1.2 01/04/2024    ALKPHOS 94 01/04/2024    AST 46 01/04/2024    ALT 67 (H) 01/04/2024       Imaging:   Echo    A limited echo was performed.    Overall the study quality was adequate.    Left Ventricle: The left ventricle is normal in size. Normal wall   thickness. Normal wall motion. There is normal systolic function with a   visually estimated ejection fraction of 55 - 60%. There is normal   diastolic function.    Right Ventricle: Normal right ventricular cavity size. Systolic   function is normal.    Aortic Valve: The aortic valve is a trileaflet valve. There is mild to   moderate aortic regurgitation.    Mitral Valve: There is no stenosis. There is mild regurgitation.    Aortic root is mildly dilated measuring 3.9 cm.            Diagnoses:       1. Pancytopenia    2. Coronary artery disease involving native coronary artery of native heart without angina pectoris    3. Nephrolithiasis    4. Abnormal bowel movement    5. Routine health maintenance    6.  Refractory anemia, unspecified          Assessment and Plan:     # Pancytopenia -  Today we reviewed previous work-up including normal nutritional parameters, thyroid fxn, hepatitis/HIV screen. Diet and weight have remained standard with patient overall feeling better with no symptoms of anemia. Anemia remains at baseline, Hgb 11.4 g/dL, MCV 97, RDS 14.2. WBC 3.59, 900 absolute lymphocyte count. Without an explanation for pancytopenia, recommend bone marrow biopsy with MDS panel. We discussed today the broad spectrum of ICUS, CCUS, CHIP and MDS. Further evaluation will be helpful for diagnostic and prognostic purposes.     Will arrange procedure at [facility] per patient preference with Xanax prior to procedure. Patient's wife will drive him to the procedure and we will arrange a clinic visit afterwards to discuss results.   \   # CAD - MI after Thanksgiving 11/2022 + PCI in one artery. Followed by dietary and exercise modifications. Taking Losartan 50 mg, Statin, ASA + Brilinta.      # Nephrolithiasis - Resolved, no blood in urine x2m      # Irregular Bowel Mvmts - Resolved, normal colonoscopy outside of [facility] end of 2023.     # RHM  - Weight loss 215 -> 165 lbs, 156 - 160 lbs is current basline. UTD with age related cancer screenings.     he will return to clinic in 6w, but knows to call in the interim if symptoms change or should a problem arise.    Mackenzie Ramon MD  Hematology/Oncology  [facility]      Med Onc Chart Routing      Follow up with physician 6 weeks. Tristen   Follow up with MACKENZIE    Infusion scheduling note    Injection scheduling note    Labs CMP, CBC, LDH and other   Scheduling:  Preferred lab:  Lab interval:  lab apt  prior to clinic visit: cbc, cmp, ldh, uric acid   Imaging    Pharmacy appointment    Other referrals         Bone marrow biopsy at [facility]         The patient location is: home  The chief complaint leading to consultation is: pancytopenia    Visit type:  audiovisual    Face to Face time with patient: 20  30 minutes of total time spent on the encounter, which includes face to face time and non-face to face time preparing to see the patient (eg, review of tests), Obtaining and/or reviewing separately obtained history, Documenting clinical information in the electronic or other health record, Independently interpreting results (not separately reported) and communicating results to the patient/family/caregiver, or Care coordination (not separately reported).         Each patient to whom he or she provides medical services by telemedicine is:  (1) informed of the relationship between the physician and patient and the respective role of any other health care provider with respect to management of the patient; and (2) notified that he or she may decline to receive medical services by telemedicine and may withdraw from such care at any time.    Notes:         Answers submitted by the patient for this visit:  Review of Systems Questionnaire (Submitted on 1/15/2024)  appetite change : No  unexpected weight change: No  mouth sores: No  visual disturbance: No  cough: No  shortness of breath: No  chest pain: No  abdominal pain: No  diarrhea: No  frequency: No  back pain: No  rash: No  headaches: No  adenopathy: No  nervous/ anxious: No

## 2024-01-25 DIAGNOSIS — D53.9 MACROCYTIC ANEMIA: Primary | ICD-10-CM

## 2024-01-25 DIAGNOSIS — Q99.9 CHROMOSOMAL ABNORMALITY, UNSPECIFIED: ICD-10-CM

## 2024-01-29 ENCOUNTER — PROCEDURE VISIT (OUTPATIENT)
Dept: HEMATOLOGY/ONCOLOGY | Facility: CLINIC | Age: 74
End: 2024-01-29
Payer: MEDICARE

## 2024-01-29 ENCOUNTER — TELEPHONE (OUTPATIENT)
Dept: HEMATOLOGY/ONCOLOGY | Facility: CLINIC | Age: 74
End: 2024-01-29
Payer: MEDICARE

## 2024-01-29 VITALS
DIASTOLIC BLOOD PRESSURE: 61 MMHG | HEART RATE: 50 BPM | OXYGEN SATURATION: 99 % | SYSTOLIC BLOOD PRESSURE: 127 MMHG | RESPIRATION RATE: 16 BRPM | TEMPERATURE: 97 F

## 2024-01-29 DIAGNOSIS — Q99.9 CHROMOSOMAL ABNORMALITY, UNSPECIFIED: ICD-10-CM

## 2024-01-29 DIAGNOSIS — D53.9 MACROCYTIC ANEMIA: ICD-10-CM

## 2024-01-29 PROCEDURE — 88189 FLOWCYTOMETRY/READ 16 & >: CPT | Mod: ,,, | Performed by: PATHOLOGY

## 2024-01-29 PROCEDURE — 85097 BONE MARROW INTERPRETATION: CPT | Mod: ,,, | Performed by: PATHOLOGY

## 2024-01-29 PROCEDURE — 88342 IMHCHEM/IMCYTCHM 1ST ANTB: CPT | Performed by: PATHOLOGY

## 2024-01-29 PROCEDURE — 88237 TISSUE CULTURE BONE MARROW: CPT

## 2024-01-29 PROCEDURE — 88313 SPECIAL STAINS GROUP 2: CPT | Mod: 26,,, | Performed by: PATHOLOGY

## 2024-01-29 PROCEDURE — 88184 FLOWCYTOMETRY/ TC 1 MARKER: CPT | Performed by: PATHOLOGY

## 2024-01-29 PROCEDURE — 88264 CHROMOSOME ANALYSIS 20-25: CPT

## 2024-01-29 PROCEDURE — 81450 HL NEO GSAP 5-50DNA/DNA&RNA: CPT

## 2024-01-29 PROCEDURE — 88299 UNLISTED CYTOGENETIC STUDY: CPT

## 2024-01-29 PROCEDURE — 88305 TISSUE EXAM BY PATHOLOGIST: CPT | Mod: 26,,, | Performed by: PATHOLOGY

## 2024-01-29 PROCEDURE — 38222 DX BONE MARROW BX & ASPIR: CPT | Mod: LT,S$GLB,,

## 2024-01-29 PROCEDURE — 88305 TISSUE EXAM BY PATHOLOGIST: CPT | Mod: 59 | Performed by: PATHOLOGY

## 2024-01-29 PROCEDURE — 88341 IMHCHEM/IMCYTCHM EA ADD ANTB: CPT | Mod: 26,,, | Performed by: PATHOLOGY

## 2024-01-29 PROCEDURE — 88185 FLOWCYTOMETRY/TC ADD-ON: CPT | Performed by: PATHOLOGY

## 2024-01-29 PROCEDURE — 88313 SPECIAL STAINS GROUP 2: CPT | Performed by: PATHOLOGY

## 2024-01-29 PROCEDURE — 88275 CYTOGENETICS 100-300: CPT | Mod: 59

## 2024-01-29 PROCEDURE — 88311 DECALCIFY TISSUE: CPT | Performed by: PATHOLOGY

## 2024-01-29 PROCEDURE — 88341 IMHCHEM/IMCYTCHM EA ADD ANTB: CPT | Performed by: PATHOLOGY

## 2024-01-29 PROCEDURE — 88342 IMHCHEM/IMCYTCHM 1ST ANTB: CPT | Mod: 26,59,, | Performed by: PATHOLOGY

## 2024-01-29 RX ORDER — LIDOCAINE HYDROCHLORIDE 20 MG/ML
8 INJECTION, SOLUTION INFILTRATION; PERINEURAL
Status: COMPLETED | OUTPATIENT
Start: 2024-01-29 | End: 2024-01-29

## 2024-01-29 RX ADMIN — LIDOCAINE HYDROCHLORIDE 8 ML: 20 INJECTION, SOLUTION INFILTRATION; PERINEURAL at 03:01

## 2024-01-29 NOTE — PROCEDURES
Bone marrow    Date/Time: 1/29/2024 3:15 PM    Performed by: Carol Miranda NP  Authorized by: Carol Miranda NP    Consent Done?: Yes (Written)   Immediately prior to procedure a time out was called to verify the correct patient, procedure, equipment, support staff and site/side marked as required. .    Aspiration?: Yes   Biopsy?: Yes      PROCEDURE NOTE:  Date of Procedure: 01/29/2024   Procedure: Bone Marrow Biopsy and Aspiration  Indication: Cytopenias CCUS vs MDS  Consent: Informed consent was obtained from patient.  Timeout: Done and documented.  Position: prone  Site: Left posterior illiac crest.  Prep: Betadine.  Needle used: 11 gauge Jamshidi needle.  Anesthetic: 2% lidocaine 8 cc.  Biopsy: The biopsy needle was introduced into the marrow cavity and 25 cc of aspirate was obtained without complications and sent for flow cytometry, NGS, DNA/RNA hold, and AML FISH and cytogenetics. Core biopsy obtained without difficulty and sent for routine histologic examination.  Complications: None.  Disposition: The patient was placed supine for 15min following procedure. Clinician to assess bandaid for bleeding prior to discharge home.  Blood loss: Minimal.     Discharge instructions for having a Bone Marrow Aspiration / Biopsy:    Keep Bandage in place for 24 hours.  - Do not shower or take a tube bath during this time (you may sponge bathe).  - Call the nurse or physician for excessive bleeding or pain at biopsy site.  - You may take Tylenol as needed for pain unless otherwise specified by your doctor.     You can call [phone] for any problems during the hours of 8:00 AM-5:00PM.    For an emergency after 5:00 PM you can call [phone] and have the  page the Hematologist / Oncologist on call.      Paras Rodriguez  Malignant Hematology & Bone Marrow Transplant

## 2024-01-29 NOTE — TELEPHONE ENCOUNTER
Spoke with pt regards to results of the bone marrow biopsy. Pt verbalized understanding regards to how long the results take.      ----- Message from Coral Pierre sent at 1/29/2024  9:44 AM CST -----  Contact: self  Type:  Needs Medical Advice    Who Called: Pt  Best Call Back Number:  [phone]  Additional Information:  Pt is scheduled for a BMT Biopsy today and has some questions regarding that test... Please call to advise... Thank you...

## 2024-01-31 ENCOUNTER — TELEPHONE (OUTPATIENT)
Dept: HEMATOLOGY/ONCOLOGY | Facility: CLINIC | Age: 74
End: 2024-01-31
Payer: MEDICARE

## 2024-01-31 LAB
AML FISH REASON FOR REFERRAL (BM): NORMAL
ANNOTATION COMMENT IMP: NORMAL
BODY SITE - BONE MARROW: NORMAL
CELLS W CYTOGENETIC ABNL BLD/T: NORMAL
CHROM ANALY RESULT (ISCN): NORMAL
CLINICAL CYTOGENETICIST REVIEW: NORMAL
CLINICAL DIAGNOSIS - BONE MARROW: NORMAL
DNA/RNA EXTRACT AND HOLD RESULT: NORMAL
DNA/RNA EXTRACTION: NORMAL
EXHR SPECIMEN TYPE: NORMAL
FLOW CYTOMETRY ANTIBODIES ANALYZED - BONE MARROW: NORMAL
FLOW CYTOMETRY COMMENT - BONE MARROW: NORMAL
FLOW CYTOMETRY INTERPRETATION - BONE MARROW: NORMAL
LAB TEST METHOD: NORMAL
MOL DX INTERP BLD/T QL: NORMAL
PROVIDER SIGNING NAME: NORMAL
SPECIMEN SOURCE: NORMAL
TEST PERFORMANCE INFO SPEC: NORMAL

## 2024-01-31 NOTE — TELEPHONE ENCOUNTER
----- Message from Mason Alvarenga sent at 1/31/2024  3:05 PM CST -----  Type: Need Medical Advice   Who Called:Patient  Best callback number: [phone]   Additional Information: Patient returned missed call from Dr Ramon  Please call to further assist, Thanks.     Per Dr Ramon, notified patient that bone marrow biopsy results thus far are normal and that we will be in touch once we have the final report. Informed patient that this could take up to two weeks. Patient verbalized understanding and was very appreciative of the phone call.

## 2024-01-31 NOTE — TELEPHONE ENCOUNTER
----- Message from Carol Miranda NP sent at 1/31/2024 11:53 AM CST -----    ----- Message -----  From: [facility]  Sent: 1/29/2024   4:29 PM CST  To: Carol Miranda NP

## 2024-02-06 LAB
CHROM BANDING METHOD: NORMAL
CHROMOSOME ANALYSIS BM ADDITIONAL INFORMATION: NORMAL
CHROMOSOME ANALYSIS BM RELEASED BY: NORMAL
CHROMOSOME ANALYSIS BM RESULT SUMMARY: NORMAL
CLINICAL CYTOGENETICIST REVIEW: NORMAL
KARYOTYP MAR: NORMAL
REASON FOR REFERRAL (NARRATIVE): NORMAL
REF LAB TEST METHOD: NORMAL
SPECIMEN SOURCE: NORMAL
SPECIMEN: NORMAL

## 2024-02-09 ENCOUNTER — TELEPHONE (OUTPATIENT)
Dept: HEMATOLOGY/ONCOLOGY | Facility: CLINIC | Age: 74
End: 2024-02-09
Payer: MEDICARE

## 2024-02-09 DIAGNOSIS — D50.8 IRON DEFICIENCY ANEMIA SECONDARY TO INADEQUATE DIETARY IRON INTAKE: Primary | ICD-10-CM

## 2024-02-09 DIAGNOSIS — D50.0 IRON DEFICIENCY ANEMIA DUE TO CHRONIC BLOOD LOSS: ICD-10-CM

## 2024-02-09 LAB
ANNOTATION COMMENT IMP: NORMAL
COMMENT: NORMAL
FINAL PATHOLOGIC DIAGNOSIS: NORMAL
GROSS: NORMAL
Lab: NORMAL
MICROSCOPIC EXAM: NORMAL
NGS CLINCIAL TRIALS: NORMAL
NGS INDICATION OF TEST: NORMAL
NGS INTERPRETATION: NORMAL
NGS ONCOHEME PANEL GENE LIST: NORMAL
NGS PATHOGENIC MUTATIONS DETECTED: NORMAL
NGS REVIEWED BY:: NORMAL
NGS VARIANTS OF UNKNOWN SIGNIFICANCE: NORMAL
NGSHM RESULT, BONE MARROW: NORMAL
REF LAB TEST METHOD: NORMAL
SPECIMEN SOURCE: NORMAL
SUPPLEMENTAL DIAGNOSIS: NORMAL
TEST PERFORMANCE INFO SPEC: NORMAL

## 2024-02-09 RX ORDER — SODIUM CHLORIDE 0.9 % (FLUSH) 0.9 %
10 SYRINGE (ML) INJECTION
Status: CANCELLED | OUTPATIENT
Start: 2024-02-16

## 2024-02-09 RX ORDER — EPINEPHRINE 0.3 MG/.3ML
0.3 INJECTION SUBCUTANEOUS ONCE AS NEEDED
Status: CANCELLED | OUTPATIENT
Start: 2024-02-09

## 2024-02-09 RX ORDER — HEPARIN 100 UNIT/ML
500 SYRINGE INTRAVENOUS
Status: CANCELLED | OUTPATIENT
Start: 2024-02-09

## 2024-02-09 RX ORDER — DIPHENHYDRAMINE HYDROCHLORIDE 50 MG/ML
50 INJECTION INTRAMUSCULAR; INTRAVENOUS ONCE AS NEEDED
Status: CANCELLED | OUTPATIENT
Start: 2024-02-09

## 2024-02-09 RX ORDER — SODIUM CHLORIDE 0.9 % (FLUSH) 0.9 %
10 SYRINGE (ML) INJECTION
Status: CANCELLED | OUTPATIENT
Start: 2024-02-09

## 2024-02-09 RX ORDER — EPINEPHRINE 0.3 MG/.3ML
0.3 INJECTION SUBCUTANEOUS ONCE AS NEEDED
Status: CANCELLED | OUTPATIENT
Start: 2024-02-16

## 2024-02-09 RX ORDER — DIPHENHYDRAMINE HYDROCHLORIDE 50 MG/ML
50 INJECTION INTRAMUSCULAR; INTRAVENOUS ONCE AS NEEDED
Status: CANCELLED | OUTPATIENT
Start: 2024-02-16

## 2024-02-09 RX ORDER — HEPARIN 100 UNIT/ML
500 SYRINGE INTRAVENOUS
Status: CANCELLED | OUTPATIENT
Start: 2024-02-16

## 2024-02-09 NOTE — TELEPHONE ENCOUNTER
----- Message from Carol Miranda NP sent at 2/9/2024  4:38 PM CST -----    ----- Message -----  From: [facility]  Sent: 1/29/2024   4:29 PM CST  To: Carol Miranda NP

## 2024-02-12 ENCOUNTER — PATIENT MESSAGE (OUTPATIENT)
Dept: HEMATOLOGY/ONCOLOGY | Facility: CLINIC | Age: 74
End: 2024-02-12
Payer: MEDICARE

## 2024-02-20 ENCOUNTER — TELEPHONE (OUTPATIENT)
Dept: INFUSION THERAPY | Facility: HOSPITAL | Age: 74
End: 2024-02-20
Payer: MEDICARE

## 2024-02-21 RX ORDER — DIPHENHYDRAMINE HYDROCHLORIDE 50 MG/ML
50 INJECTION INTRAMUSCULAR; INTRAVENOUS ONCE AS NEEDED
Status: CANCELLED | OUTPATIENT
Start: 2024-02-23

## 2024-02-21 RX ORDER — HEPARIN 100 UNIT/ML
500 SYRINGE INTRAVENOUS
Status: CANCELLED | OUTPATIENT
Start: 2024-04-05

## 2024-02-21 RX ORDER — HEPARIN 100 UNIT/ML
500 SYRINGE INTRAVENOUS
Status: CANCELLED | OUTPATIENT
Start: 2024-03-29

## 2024-02-21 RX ORDER — HEPARIN 100 UNIT/ML
500 SYRINGE INTRAVENOUS
Status: CANCELLED | OUTPATIENT
Start: 2024-02-23

## 2024-02-21 RX ORDER — HEPARIN 100 UNIT/ML
500 SYRINGE INTRAVENOUS
Status: CANCELLED | OUTPATIENT
Start: 2024-02-21

## 2024-02-21 RX ORDER — EPINEPHRINE 0.3 MG/.3ML
0.3 INJECTION SUBCUTANEOUS ONCE AS NEEDED
Status: CANCELLED | OUTPATIENT
Start: 2024-03-29

## 2024-02-21 RX ORDER — HEPARIN 100 UNIT/ML
500 SYRINGE INTRAVENOUS
Status: CANCELLED | OUTPATIENT
Start: 2024-03-22

## 2024-02-21 RX ORDER — SODIUM CHLORIDE 0.9 % (FLUSH) 0.9 %
10 SYRINGE (ML) INJECTION
Status: CANCELLED | OUTPATIENT
Start: 2024-03-01

## 2024-02-21 RX ORDER — HEPARIN 100 UNIT/ML
500 SYRINGE INTRAVENOUS
Status: CANCELLED | OUTPATIENT
Start: 2024-03-15

## 2024-02-21 RX ORDER — HEPARIN 100 UNIT/ML
500 SYRINGE INTRAVENOUS
Status: CANCELLED | OUTPATIENT
Start: 2024-03-08

## 2024-02-21 RX ORDER — SODIUM CHLORIDE 0.9 % (FLUSH) 0.9 %
10 SYRINGE (ML) INJECTION
Status: CANCELLED | OUTPATIENT
Start: 2024-03-08

## 2024-02-21 RX ORDER — SODIUM CHLORIDE 0.9 % (FLUSH) 0.9 %
10 SYRINGE (ML) INJECTION
Status: CANCELLED | OUTPATIENT
Start: 2024-03-22

## 2024-02-21 RX ORDER — EPINEPHRINE 0.3 MG/.3ML
0.3 INJECTION SUBCUTANEOUS ONCE AS NEEDED
Status: CANCELLED | OUTPATIENT
Start: 2024-04-05

## 2024-02-21 RX ORDER — HEPARIN 100 UNIT/ML
500 SYRINGE INTRAVENOUS
Status: CANCELLED | OUTPATIENT
Start: 2024-03-01

## 2024-02-21 RX ORDER — DIPHENHYDRAMINE HYDROCHLORIDE 50 MG/ML
50 INJECTION INTRAMUSCULAR; INTRAVENOUS ONCE AS NEEDED
Status: CANCELLED | OUTPATIENT
Start: 2024-03-22

## 2024-02-21 RX ORDER — DIPHENHYDRAMINE HYDROCHLORIDE 50 MG/ML
50 INJECTION INTRAMUSCULAR; INTRAVENOUS ONCE AS NEEDED
Status: CANCELLED | OUTPATIENT
Start: 2024-03-29

## 2024-02-21 RX ORDER — EPINEPHRINE 0.3 MG/.3ML
0.3 INJECTION SUBCUTANEOUS ONCE AS NEEDED
Status: CANCELLED | OUTPATIENT
Start: 2024-03-15

## 2024-02-21 RX ORDER — SODIUM CHLORIDE 0.9 % (FLUSH) 0.9 %
10 SYRINGE (ML) INJECTION
Status: CANCELLED | OUTPATIENT
Start: 2024-02-23

## 2024-02-21 RX ORDER — SODIUM CHLORIDE 0.9 % (FLUSH) 0.9 %
10 SYRINGE (ML) INJECTION
Status: CANCELLED | OUTPATIENT
Start: 2024-04-05

## 2024-02-21 RX ORDER — SODIUM CHLORIDE 0.9 % (FLUSH) 0.9 %
10 SYRINGE (ML) INJECTION
Status: CANCELLED | OUTPATIENT
Start: 2024-03-29

## 2024-02-21 RX ORDER — DIPHENHYDRAMINE HYDROCHLORIDE 50 MG/ML
50 INJECTION INTRAMUSCULAR; INTRAVENOUS ONCE AS NEEDED
Status: CANCELLED | OUTPATIENT
Start: 2024-03-15

## 2024-02-21 RX ORDER — SODIUM CHLORIDE 0.9 % (FLUSH) 0.9 %
10 SYRINGE (ML) INJECTION
Status: CANCELLED | OUTPATIENT
Start: 2024-02-21

## 2024-02-21 RX ORDER — EPINEPHRINE 0.3 MG/.3ML
0.3 INJECTION SUBCUTANEOUS ONCE AS NEEDED
Status: CANCELLED | OUTPATIENT
Start: 2024-03-22

## 2024-02-21 RX ORDER — EPINEPHRINE 0.3 MG/.3ML
0.3 INJECTION SUBCUTANEOUS ONCE AS NEEDED
Status: CANCELLED | OUTPATIENT
Start: 2024-02-23

## 2024-02-21 RX ORDER — DIPHENHYDRAMINE HYDROCHLORIDE 50 MG/ML
50 INJECTION INTRAMUSCULAR; INTRAVENOUS ONCE AS NEEDED
Status: CANCELLED | OUTPATIENT
Start: 2024-02-21

## 2024-02-21 RX ORDER — DIPHENHYDRAMINE HYDROCHLORIDE 50 MG/ML
50 INJECTION INTRAMUSCULAR; INTRAVENOUS ONCE AS NEEDED
Status: CANCELLED | OUTPATIENT
Start: 2024-03-01

## 2024-02-21 RX ORDER — EPINEPHRINE 0.3 MG/.3ML
0.3 INJECTION SUBCUTANEOUS ONCE AS NEEDED
Status: CANCELLED | OUTPATIENT
Start: 2024-03-01

## 2024-02-21 RX ORDER — DIPHENHYDRAMINE HYDROCHLORIDE 50 MG/ML
50 INJECTION INTRAMUSCULAR; INTRAVENOUS ONCE AS NEEDED
Status: CANCELLED | OUTPATIENT
Start: 2024-03-08

## 2024-02-21 RX ORDER — EPINEPHRINE 0.3 MG/.3ML
0.3 INJECTION SUBCUTANEOUS ONCE AS NEEDED
Status: CANCELLED | OUTPATIENT
Start: 2024-02-21

## 2024-02-21 RX ORDER — DIPHENHYDRAMINE HYDROCHLORIDE 50 MG/ML
50 INJECTION INTRAMUSCULAR; INTRAVENOUS ONCE AS NEEDED
Status: CANCELLED | OUTPATIENT
Start: 2024-04-05

## 2024-02-21 RX ORDER — SODIUM CHLORIDE 0.9 % (FLUSH) 0.9 %
10 SYRINGE (ML) INJECTION
Status: CANCELLED | OUTPATIENT
Start: 2024-03-15

## 2024-02-21 RX ORDER — EPINEPHRINE 0.3 MG/.3ML
0.3 INJECTION SUBCUTANEOUS ONCE AS NEEDED
Status: CANCELLED | OUTPATIENT
Start: 2024-03-08

## 2024-02-22 ENCOUNTER — INFUSION (OUTPATIENT)
Dept: INFUSION THERAPY | Facility: HOSPITAL | Age: 74
End: 2024-02-22
Attending: STUDENT IN AN ORGANIZED HEALTH CARE EDUCATION/TRAINING PROGRAM
Payer: MEDICARE

## 2024-02-22 VITALS
HEIGHT: 72 IN | SYSTOLIC BLOOD PRESSURE: 134 MMHG | BODY MASS INDEX: 21.77 KG/M2 | HEART RATE: 54 BPM | DIASTOLIC BLOOD PRESSURE: 72 MMHG | RESPIRATION RATE: 18 BRPM | WEIGHT: 160.69 LBS | TEMPERATURE: 98 F

## 2024-02-22 DIAGNOSIS — D50.0 IRON DEFICIENCY ANEMIA DUE TO CHRONIC BLOOD LOSS: Primary | ICD-10-CM

## 2024-02-22 PROCEDURE — 96374 THER/PROPH/DIAG INJ IV PUSH: CPT | Mod: PN

## 2024-02-22 PROCEDURE — 25000003 PHARM REV CODE 250: Mod: PN | Performed by: STUDENT IN AN ORGANIZED HEALTH CARE EDUCATION/TRAINING PROGRAM

## 2024-02-22 PROCEDURE — 63600175 PHARM REV CODE 636 W HCPCS: Mod: PN | Performed by: STUDENT IN AN ORGANIZED HEALTH CARE EDUCATION/TRAINING PROGRAM

## 2024-02-22 RX ORDER — DIPHENHYDRAMINE HYDROCHLORIDE 50 MG/ML
50 INJECTION INTRAMUSCULAR; INTRAVENOUS ONCE AS NEEDED
Status: DISCONTINUED | OUTPATIENT
Start: 2024-02-22 | End: 2024-02-22 | Stop reason: HOSPADM

## 2024-02-22 RX ORDER — HEPARIN 100 UNIT/ML
500 SYRINGE INTRAVENOUS
Status: DISCONTINUED | OUTPATIENT
Start: 2024-02-22 | End: 2024-02-22 | Stop reason: HOSPADM

## 2024-02-22 RX ORDER — EPINEPHRINE 0.3 MG/.3ML
0.3 INJECTION SUBCUTANEOUS ONCE AS NEEDED
Status: DISCONTINUED | OUTPATIENT
Start: 2024-02-22 | End: 2024-02-22 | Stop reason: HOSPADM

## 2024-02-22 RX ORDER — SODIUM CHLORIDE 0.9 % (FLUSH) 0.9 %
10 SYRINGE (ML) INJECTION
Status: DISCONTINUED | OUTPATIENT
Start: 2024-02-22 | End: 2024-02-22 | Stop reason: HOSPADM

## 2024-02-22 RX ADMIN — SODIUM CHLORIDE: 9 INJECTION, SOLUTION INTRAVENOUS at 04:02

## 2024-02-22 RX ADMIN — IRON SUCROSE 200 MG: 20 INJECTION, SOLUTION INTRAVENOUS at 04:02

## 2024-02-22 NOTE — PLAN OF CARE
Tolerated venofer well.  Observed pt 30 minutes post IVP, no reactions noted.  No questions or concerns at this time.  Ambulated off unit in NAD.

## 2024-02-28 ENCOUNTER — INFUSION (OUTPATIENT)
Dept: INFUSION THERAPY | Facility: HOSPITAL | Age: 74
End: 2024-02-28
Attending: STUDENT IN AN ORGANIZED HEALTH CARE EDUCATION/TRAINING PROGRAM
Payer: MEDICARE

## 2024-02-28 VITALS
DIASTOLIC BLOOD PRESSURE: 51 MMHG | SYSTOLIC BLOOD PRESSURE: 107 MMHG | HEART RATE: 61 BPM | BODY MASS INDEX: 21.8 KG/M2 | WEIGHT: 160.94 LBS | OXYGEN SATURATION: 98 % | RESPIRATION RATE: 16 BRPM | TEMPERATURE: 99 F | HEIGHT: 72 IN

## 2024-02-28 DIAGNOSIS — D50.0 IRON DEFICIENCY ANEMIA DUE TO CHRONIC BLOOD LOSS: Primary | ICD-10-CM

## 2024-02-28 PROCEDURE — 96374 THER/PROPH/DIAG INJ IV PUSH: CPT | Mod: PN

## 2024-02-28 PROCEDURE — 25000003 PHARM REV CODE 250: Mod: PN | Performed by: STUDENT IN AN ORGANIZED HEALTH CARE EDUCATION/TRAINING PROGRAM

## 2024-02-28 PROCEDURE — 63600175 PHARM REV CODE 636 W HCPCS: Mod: PN | Performed by: STUDENT IN AN ORGANIZED HEALTH CARE EDUCATION/TRAINING PROGRAM

## 2024-02-28 RX ORDER — SODIUM CHLORIDE 0.9 % (FLUSH) 0.9 %
10 SYRINGE (ML) INJECTION
Status: DISCONTINUED | OUTPATIENT
Start: 2024-02-28 | End: 2024-02-28 | Stop reason: HOSPADM

## 2024-02-28 RX ORDER — EPINEPHRINE 0.3 MG/.3ML
0.3 INJECTION SUBCUTANEOUS ONCE AS NEEDED
Status: DISCONTINUED | OUTPATIENT
Start: 2024-02-28 | End: 2024-02-28 | Stop reason: HOSPADM

## 2024-02-28 RX ORDER — DIPHENHYDRAMINE HYDROCHLORIDE 50 MG/ML
50 INJECTION INTRAMUSCULAR; INTRAVENOUS ONCE AS NEEDED
Status: DISCONTINUED | OUTPATIENT
Start: 2024-02-28 | End: 2024-02-28 | Stop reason: HOSPADM

## 2024-02-28 RX ADMIN — SODIUM CHLORIDE: 9 INJECTION, SOLUTION INTRAVENOUS at 04:02

## 2024-02-28 RX ADMIN — IRON SUCROSE 200 MG: 20 INJECTION, SOLUTION INTRAVENOUS at 04:02

## 2024-02-28 NOTE — PLAN OF CARE
Problem: Adult Inpatient Plan of Care  Goal: Patient-Specific Goal (Individualized)  Outcome: Ongoing, Progressing  Flowsheets (Taken 2/28/2024 1720)  Anxieties, Fears or Concerns: None  Individualized Care Needs: Recliner     Problem: Fatigue  Goal: Improved Activity Tolerance  Intervention: Promote Improved Energy  Flowsheets (Taken 2/28/2024 1720)  Fatigue Management:   activity schedule adjusted   paced activity encouraged   fatigue-related activity identified  Sleep/Rest Enhancement:   relaxation techniques promoted   regular sleep/rest pattern promoted  Activity Management:   Ambulated -L4   Ambulated in herring - L4     Problem: Adult Inpatient Plan of Care  Goal: Plan of Care Review  Outcome: Ongoing, Progressing  Flowsheets (Taken 2/28/2024 1720)  Plan of Care Reviewed With: patient  Tolerated treatment with no known distress.  Ambulated from infusion center with steady gait.

## 2024-03-06 ENCOUNTER — INFUSION (OUTPATIENT)
Dept: INFUSION THERAPY | Facility: HOSPITAL | Age: 74
End: 2024-03-06
Attending: STUDENT IN AN ORGANIZED HEALTH CARE EDUCATION/TRAINING PROGRAM
Payer: MEDICARE

## 2024-03-06 VITALS
HEART RATE: 53 BPM | TEMPERATURE: 98 F | RESPIRATION RATE: 16 BRPM | BODY MASS INDEX: 21.83 KG/M2 | SYSTOLIC BLOOD PRESSURE: 114 MMHG | DIASTOLIC BLOOD PRESSURE: 60 MMHG | WEIGHT: 160.94 LBS

## 2024-03-06 DIAGNOSIS — D50.0 IRON DEFICIENCY ANEMIA DUE TO CHRONIC BLOOD LOSS: Primary | ICD-10-CM

## 2024-03-06 PROCEDURE — 96374 THER/PROPH/DIAG INJ IV PUSH: CPT | Mod: PN

## 2024-03-06 PROCEDURE — 25000003 PHARM REV CODE 250: Mod: PN | Performed by: STUDENT IN AN ORGANIZED HEALTH CARE EDUCATION/TRAINING PROGRAM

## 2024-03-06 PROCEDURE — 63600175 PHARM REV CODE 636 W HCPCS: Mod: PN | Performed by: STUDENT IN AN ORGANIZED HEALTH CARE EDUCATION/TRAINING PROGRAM

## 2024-03-06 RX ORDER — SODIUM CHLORIDE 0.9 % (FLUSH) 0.9 %
10 SYRINGE (ML) INJECTION
Status: DISCONTINUED | OUTPATIENT
Start: 2024-03-06 | End: 2024-03-06 | Stop reason: HOSPADM

## 2024-03-06 RX ADMIN — IRON SUCROSE 200 MG: 20 INJECTION, SOLUTION INTRAVENOUS at 04:03

## 2024-03-06 RX ADMIN — SODIUM CHLORIDE: 9 INJECTION, SOLUTION INTRAVENOUS at 04:03

## 2024-03-11 ENCOUNTER — TELEPHONE (OUTPATIENT)
Dept: HEMATOLOGY/ONCOLOGY | Facility: CLINIC | Age: 74
End: 2024-03-11
Payer: MEDICARE

## 2024-03-11 NOTE — TELEPHONE ENCOUNTER
Patient would like to schedule office visit with Dr Ramon sooner than May appointment to discuss unintentional weight loss. Scheduled next available benign heme appt on 3/26. Patient verbalized understanding of appt date and time.    ----- Message from Vidya Morocho, Patient Care Assistant sent at 3/11/2024 11:18 AM CDT -----  Type: Needs Medical Advice  Who Called:  renuka  Chandrakant Call Back Number: [phone]    Additional Information: tate is wanting to speak to nurse about his general condition , he states he has infusion on Wednesday and would like to be seen before 5/21 if possible , please call to further discuss ,thank you.

## 2024-03-12 ENCOUNTER — TELEPHONE (OUTPATIENT)
Dept: HEMATOLOGY/ONCOLOGY | Facility: CLINIC | Age: 74
End: 2024-03-12
Payer: MEDICARE

## 2024-03-12 NOTE — TELEPHONE ENCOUNTER
----- Message from Mason Alvarenga sent at 3/12/2024  8:47 AM CDT -----  Type: Need Medical Advice   Who Called:Patient  Best callback number: [phone]  Additional Information: patient called to ask if lab orders can be put in before his appointment on 3/26, including a cancer screen. He asked that the labs be scheduled at the cancer center and call him with dated and time   Please call to further assist, Thanks.     Lab appt made for tomorrow at 4:00 pm. Dr Ramon will call patient with results.

## 2024-03-13 ENCOUNTER — TELEPHONE (OUTPATIENT)
Dept: HEMATOLOGY/ONCOLOGY | Facility: CLINIC | Age: 74
End: 2024-03-13
Payer: MEDICARE

## 2024-03-13 ENCOUNTER — INFUSION (OUTPATIENT)
Dept: INFUSION THERAPY | Facility: HOSPITAL | Age: 74
End: 2024-03-13
Attending: STUDENT IN AN ORGANIZED HEALTH CARE EDUCATION/TRAINING PROGRAM
Payer: MEDICARE

## 2024-03-13 VITALS
SYSTOLIC BLOOD PRESSURE: 101 MMHG | TEMPERATURE: 98 F | HEART RATE: 53 BPM | BODY MASS INDEX: 21.44 KG/M2 | OXYGEN SATURATION: 98 % | WEIGHT: 158.31 LBS | DIASTOLIC BLOOD PRESSURE: 57 MMHG | HEIGHT: 72 IN | RESPIRATION RATE: 16 BRPM

## 2024-03-13 DIAGNOSIS — R63.4 UNINTENTIONAL WEIGHT LOSS: Primary | ICD-10-CM

## 2024-03-13 DIAGNOSIS — D50.0 IRON DEFICIENCY ANEMIA DUE TO CHRONIC BLOOD LOSS: Primary | ICD-10-CM

## 2024-03-13 PROCEDURE — 96374 THER/PROPH/DIAG INJ IV PUSH: CPT | Mod: PN

## 2024-03-13 PROCEDURE — 63600175 PHARM REV CODE 636 W HCPCS: Mod: PN | Performed by: STUDENT IN AN ORGANIZED HEALTH CARE EDUCATION/TRAINING PROGRAM

## 2024-03-13 PROCEDURE — 25000003 PHARM REV CODE 250: Mod: PN | Performed by: STUDENT IN AN ORGANIZED HEALTH CARE EDUCATION/TRAINING PROGRAM

## 2024-03-13 RX ORDER — DIPHENHYDRAMINE HYDROCHLORIDE 50 MG/ML
50 INJECTION INTRAMUSCULAR; INTRAVENOUS ONCE AS NEEDED
Status: DISCONTINUED | OUTPATIENT
Start: 2024-03-13 | End: 2024-03-13 | Stop reason: HOSPADM

## 2024-03-13 RX ORDER — SODIUM CHLORIDE 0.9 % (FLUSH) 0.9 %
10 SYRINGE (ML) INJECTION
Status: DISCONTINUED | OUTPATIENT
Start: 2024-03-13 | End: 2024-03-13 | Stop reason: HOSPADM

## 2024-03-13 RX ORDER — EPINEPHRINE 0.3 MG/.3ML
0.3 INJECTION SUBCUTANEOUS ONCE AS NEEDED
Status: DISCONTINUED | OUTPATIENT
Start: 2024-03-13 | End: 2024-03-13 | Stop reason: HOSPADM

## 2024-03-13 RX ADMIN — IRON SUCROSE 200 MG: 20 INJECTION, SOLUTION INTRAVENOUS at 04:03

## 2024-03-13 RX ADMIN — SODIUM CHLORIDE: 9 INJECTION, SOLUTION INTRAVENOUS at 04:03

## 2024-03-13 NOTE — PLAN OF CARE
Problem: Adult Inpatient Plan of Care  Goal: Plan of Care Review  Outcome: Ongoing, Progressing  Flowsheets (Taken 3/13/2024 1718)  Plan of Care Reviewed With: patient  Goal: Patient-Specific Goal (Individualized)  Outcome: Ongoing, Progressing  Flowsheets (Taken 3/13/2024 1718)  Anxieties, Fears or Concerns: weight loss  Individualized Care Needs: education, conversation  Goal: Absence of Hospital-Acquired Illness or Injury  Outcome: Ongoing, Progressing  Goal: Optimal Comfort and Wellbeing  Outcome: Ongoing, Progressing  Intervention: Provide Person-Centered Care  Flowsheets (Taken 3/13/2024 1718)  Trust Relationship/Rapport:   care explained   questions encouraged   choices provided   reassurance provided   emotional support provided   thoughts/feelings acknowledged   empathic listening provided   questions answered  Goal: Readiness for Transition of Care  Outcome: Ongoing, Progressing     Problem: Fatigue  Goal: Improved Activity Tolerance  Outcome: Ongoing, Progressing  Intervention: Promote Improved Energy  Flowsheets (Taken 3/13/2024 1718)  Fatigue Management:   fatigue-related activity identified   paced activity encouraged   frequent rest breaks encouraged  Sleep/Rest Enhancement:   therapeutic touch utilized   relaxation techniques promoted   natural light exposure provided   consistent schedule promoted   family presence promoted   noise level reduced  Activity Management:   Ambulated -L4   Up in chair - L3

## 2024-03-14 ENCOUNTER — PATIENT MESSAGE (OUTPATIENT)
Dept: HEMATOLOGY/ONCOLOGY | Facility: CLINIC | Age: 74
End: 2024-03-14
Payer: MEDICARE

## 2024-03-14 ENCOUNTER — TELEPHONE (OUTPATIENT)
Dept: HEMATOLOGY/ONCOLOGY | Facility: CLINIC | Age: 74
End: 2024-03-14
Payer: MEDICARE

## 2024-03-14 NOTE — TELEPHONE ENCOUNTER
----- Message from Carolyn Hernandez Patient Care Assistant sent at 3/14/2024  9:41 AM CDT -----  Contact: Pt  Type: Needs Medical Advice    Who Called: Pt  Best Call Back Number: [phone]  Inquiry/Question: Pt is calling to get an email to send his medical records to. Please Advise Thank you~      Provided patient with email address to send medical records.

## 2024-03-18 ENCOUNTER — TELEPHONE (OUTPATIENT)
Dept: INFUSION THERAPY | Facility: HOSPITAL | Age: 74
End: 2024-03-18
Payer: MEDICARE

## 2024-03-18 NOTE — TELEPHONE ENCOUNTER
----- Message from Serg Parada sent at 3/18/2024  1:22 PM CDT -----  Type: Needs Medical Advice  Who Called:  tito  Best Call Back Number: [phone]  Additional Information: pt is calling the office to have his appt on Wednesday rescheduled to Thursday if possible. Please call back to advise. Thanks!

## 2024-03-21 ENCOUNTER — INFUSION (OUTPATIENT)
Dept: INFUSION THERAPY | Facility: HOSPITAL | Age: 74
End: 2024-03-21
Attending: STUDENT IN AN ORGANIZED HEALTH CARE EDUCATION/TRAINING PROGRAM
Payer: MEDICARE

## 2024-03-21 VITALS
TEMPERATURE: 98 F | HEART RATE: 55 BPM | SYSTOLIC BLOOD PRESSURE: 126 MMHG | OXYGEN SATURATION: 100 % | DIASTOLIC BLOOD PRESSURE: 59 MMHG | HEIGHT: 72 IN | BODY MASS INDEX: 21.2 KG/M2 | RESPIRATION RATE: 16 BRPM | WEIGHT: 156.5 LBS

## 2024-03-21 DIAGNOSIS — D50.0 IRON DEFICIENCY ANEMIA DUE TO CHRONIC BLOOD LOSS: Primary | ICD-10-CM

## 2024-03-21 PROCEDURE — 96374 THER/PROPH/DIAG INJ IV PUSH: CPT | Mod: PN

## 2024-03-21 PROCEDURE — 25000003 PHARM REV CODE 250: Mod: PN | Performed by: STUDENT IN AN ORGANIZED HEALTH CARE EDUCATION/TRAINING PROGRAM

## 2024-03-21 PROCEDURE — 63600175 PHARM REV CODE 636 W HCPCS: Mod: PN | Performed by: STUDENT IN AN ORGANIZED HEALTH CARE EDUCATION/TRAINING PROGRAM

## 2024-03-21 RX ORDER — SODIUM CHLORIDE 0.9 % (FLUSH) 0.9 %
10 SYRINGE (ML) INJECTION
Status: DISCONTINUED | OUTPATIENT
Start: 2024-03-21 | End: 2024-03-21 | Stop reason: HOSPADM

## 2024-03-21 RX ORDER — EPINEPHRINE 0.3 MG/.3ML
0.3 INJECTION SUBCUTANEOUS ONCE AS NEEDED
Status: DISCONTINUED | OUTPATIENT
Start: 2024-03-21 | End: 2024-03-21 | Stop reason: HOSPADM

## 2024-03-21 RX ORDER — DIPHENHYDRAMINE HYDROCHLORIDE 50 MG/ML
50 INJECTION INTRAMUSCULAR; INTRAVENOUS ONCE AS NEEDED
Status: DISCONTINUED | OUTPATIENT
Start: 2024-03-21 | End: 2024-03-21 | Stop reason: HOSPADM

## 2024-03-21 RX ADMIN — IRON SUCROSE 200 MG: 20 INJECTION, SOLUTION INTRAVENOUS at 04:03

## 2024-03-21 RX ADMIN — SODIUM CHLORIDE: 9 INJECTION, SOLUTION INTRAVENOUS at 04:03

## 2024-03-21 NOTE — PLAN OF CARE
Problem: Adult Inpatient Plan of Care  Goal: Patient-Specific Goal (Individualized)  Outcome: Ongoing, Progressing  Flowsheets (Taken 3/21/2024 1730)  Anxieties, Fears or Concerns: None  Individualized Care Needs: Recliner     Problem: Fatigue  Goal: Improved Activity Tolerance  Intervention: Promote Improved Energy  Flowsheets (Taken 3/21/2024 1730)  Fatigue Management:   activity schedule adjusted   paced activity encouraged   fatigue-related activity identified  Sleep/Rest Enhancement:   relaxation techniques promoted   regular sleep/rest pattern promoted  Activity Management:   Ambulated -L4   Ambulated in herring - L4     Problem: Adult Inpatient Plan of Care  Goal: Plan of Care Review  Outcome: Ongoing, Progressing  Flowsheets (Taken 3/21/2024 1730)  Plan of Care Reviewed With: patient  Tolerated treatment with no known distress.  Ambulated from infusion center with steady gait.

## 2024-03-26 ENCOUNTER — OFFICE VISIT (OUTPATIENT)
Dept: HEMATOLOGY/ONCOLOGY | Facility: CLINIC | Age: 74
End: 2024-03-26
Payer: MEDICARE

## 2024-03-26 ENCOUNTER — INFUSION (OUTPATIENT)
Dept: INFUSION THERAPY | Facility: HOSPITAL | Age: 74
End: 2024-03-26
Attending: STUDENT IN AN ORGANIZED HEALTH CARE EDUCATION/TRAINING PROGRAM
Payer: MEDICARE

## 2024-03-26 VITALS
BODY MASS INDEX: 21.11 KG/M2 | TEMPERATURE: 97 F | WEIGHT: 155.88 LBS | SYSTOLIC BLOOD PRESSURE: 99 MMHG | HEIGHT: 72 IN | DIASTOLIC BLOOD PRESSURE: 62 MMHG | RESPIRATION RATE: 16 BRPM | HEART RATE: 65 BPM | OXYGEN SATURATION: 98 %

## 2024-03-26 VITALS
RESPIRATION RATE: 16 BRPM | SYSTOLIC BLOOD PRESSURE: 106 MMHG | HEART RATE: 74 BPM | DIASTOLIC BLOOD PRESSURE: 58 MMHG | TEMPERATURE: 98 F

## 2024-03-26 DIAGNOSIS — D61.818 PANCYTOPENIA: Primary | ICD-10-CM

## 2024-03-26 DIAGNOSIS — I95.9 HYPOTENSION, UNSPECIFIED HYPOTENSION TYPE: ICD-10-CM

## 2024-03-26 DIAGNOSIS — R19.8 ABNORMAL BOWEL MOVEMENT: ICD-10-CM

## 2024-03-26 DIAGNOSIS — D50.0 IRON DEFICIENCY ANEMIA DUE TO CHRONIC BLOOD LOSS: Primary | ICD-10-CM

## 2024-03-26 DIAGNOSIS — D53.9 NUTRITIONAL ANEMIA: ICD-10-CM

## 2024-03-26 DIAGNOSIS — N40.0 BENIGN PROSTATIC HYPERPLASIA, UNSPECIFIED WHETHER LOWER URINARY TRACT SYMPTOMS PRESENT: ICD-10-CM

## 2024-03-26 DIAGNOSIS — I25.10 CORONARY ARTERY DISEASE INVOLVING NATIVE CORONARY ARTERY OF NATIVE HEART WITHOUT ANGINA PECTORIS: ICD-10-CM

## 2024-03-26 DIAGNOSIS — N20.0 NEPHROLITHIASIS: ICD-10-CM

## 2024-03-26 DIAGNOSIS — I95.1 ORTHOSTATIC HYPOTENSION: ICD-10-CM

## 2024-03-26 DIAGNOSIS — Z00.00 ROUTINE HEALTH MAINTENANCE: ICD-10-CM

## 2024-03-26 PROCEDURE — 3288F FALL RISK ASSESSMENT DOCD: CPT | Mod: CPTII,S$GLB,, | Performed by: STUDENT IN AN ORGANIZED HEALTH CARE EDUCATION/TRAINING PROGRAM

## 2024-03-26 PROCEDURE — 3078F DIAST BP <80 MM HG: CPT | Mod: CPTII,S$GLB,, | Performed by: STUDENT IN AN ORGANIZED HEALTH CARE EDUCATION/TRAINING PROGRAM

## 2024-03-26 PROCEDURE — 25000003 PHARM REV CODE 250: Mod: PN | Performed by: STUDENT IN AN ORGANIZED HEALTH CARE EDUCATION/TRAINING PROGRAM

## 2024-03-26 PROCEDURE — 1126F AMNT PAIN NOTED NONE PRSNT: CPT | Mod: CPTII,S$GLB,, | Performed by: STUDENT IN AN ORGANIZED HEALTH CARE EDUCATION/TRAINING PROGRAM

## 2024-03-26 PROCEDURE — 1101F PT FALLS ASSESS-DOCD LE1/YR: CPT | Mod: CPTII,S$GLB,, | Performed by: STUDENT IN AN ORGANIZED HEALTH CARE EDUCATION/TRAINING PROGRAM

## 2024-03-26 PROCEDURE — 96374 THER/PROPH/DIAG INJ IV PUSH: CPT | Mod: PN

## 2024-03-26 PROCEDURE — 63600175 PHARM REV CODE 636 W HCPCS: Mod: PN | Performed by: STUDENT IN AN ORGANIZED HEALTH CARE EDUCATION/TRAINING PROGRAM

## 2024-03-26 PROCEDURE — 1159F MED LIST DOCD IN RCRD: CPT | Mod: CPTII,S$GLB,, | Performed by: STUDENT IN AN ORGANIZED HEALTH CARE EDUCATION/TRAINING PROGRAM

## 2024-03-26 PROCEDURE — A4216 STERILE WATER/SALINE, 10 ML: HCPCS | Mod: PN | Performed by: STUDENT IN AN ORGANIZED HEALTH CARE EDUCATION/TRAINING PROGRAM

## 2024-03-26 PROCEDURE — 99215 OFFICE O/P EST HI 40 MIN: CPT | Mod: S$GLB,,, | Performed by: STUDENT IN AN ORGANIZED HEALTH CARE EDUCATION/TRAINING PROGRAM

## 2024-03-26 PROCEDURE — 1160F RVW MEDS BY RX/DR IN RCRD: CPT | Mod: CPTII,S$GLB,, | Performed by: STUDENT IN AN ORGANIZED HEALTH CARE EDUCATION/TRAINING PROGRAM

## 2024-03-26 PROCEDURE — 3008F BODY MASS INDEX DOCD: CPT | Mod: CPTII,S$GLB,, | Performed by: STUDENT IN AN ORGANIZED HEALTH CARE EDUCATION/TRAINING PROGRAM

## 2024-03-26 PROCEDURE — 99999 PR PBB SHADOW E&M-EST. PATIENT-LVL III: CPT | Mod: PBBFAC,,, | Performed by: STUDENT IN AN ORGANIZED HEALTH CARE EDUCATION/TRAINING PROGRAM

## 2024-03-26 PROCEDURE — 3074F SYST BP LT 130 MM HG: CPT | Mod: CPTII,S$GLB,, | Performed by: STUDENT IN AN ORGANIZED HEALTH CARE EDUCATION/TRAINING PROGRAM

## 2024-03-26 PROCEDURE — 4010F ACE/ARB THERAPY RXD/TAKEN: CPT | Mod: CPTII,S$GLB,, | Performed by: STUDENT IN AN ORGANIZED HEALTH CARE EDUCATION/TRAINING PROGRAM

## 2024-03-26 RX ORDER — SODIUM CHLORIDE 0.9 % (FLUSH) 0.9 %
10 SYRINGE (ML) INJECTION
Status: DISCONTINUED | OUTPATIENT
Start: 2024-03-26 | End: 2024-03-26 | Stop reason: HOSPADM

## 2024-03-26 RX ORDER — HEPARIN 100 UNIT/ML
500 SYRINGE INTRAVENOUS
Status: DISCONTINUED | OUTPATIENT
Start: 2024-03-26 | End: 2024-03-26 | Stop reason: HOSPADM

## 2024-03-26 RX ORDER — EPINEPHRINE 0.3 MG/.3ML
0.3 INJECTION SUBCUTANEOUS ONCE AS NEEDED
Status: DISCONTINUED | OUTPATIENT
Start: 2024-03-26 | End: 2024-03-26 | Stop reason: HOSPADM

## 2024-03-26 RX ORDER — DIPHENHYDRAMINE HYDROCHLORIDE 50 MG/ML
50 INJECTION INTRAMUSCULAR; INTRAVENOUS ONCE AS NEEDED
Status: DISCONTINUED | OUTPATIENT
Start: 2024-03-26 | End: 2024-03-26 | Stop reason: HOSPADM

## 2024-03-26 RX ADMIN — SODIUM CHLORIDE: 9 INJECTION, SOLUTION INTRAVENOUS at 12:03

## 2024-03-26 RX ADMIN — Medication 10 ML: at 12:03

## 2024-03-26 RX ADMIN — IRON SUCROSE 200 MG: 20 INJECTION, SOLUTION INTRAVENOUS at 12:03

## 2024-03-26 NOTE — PROGRESS NOTES
[facility]  FOLLOW-UP VISIT.     Reason for visit: follow up    Best Contact Phone Number(s): [phone] (home)      Pancytopenia  10/2023: Referral for WBC 3.07, nrml differential, Hgb 11.3, MCV 98, plts 146k. Nutritional studies replete, copper wnl, pathology interpretation shows mild anisopoikilocytosis. CT Chest shows 4 mm left lower lobe pulmonary nodule, bilateral nonobstructing renal stones, prominence of seminal vesicles, prostatomegaly  1/2024: Bone marrow biopsy shows normocellular marrow (30-40%), with no blasts, trilineage hematopoiesis with no dyspoiesis and focally increased megakaryocytes, decreased stainable iron, mildly increase reticulin fibrosis, negative Oncoheme NGS, negative AML FISH, normal cytogenetics     Normocytic Anemia  2007: Normocytic anemia has been noted with Hgb in 11 range     Interval History: Jeffrey Schoen is a 73 y.o. male with pancytopenia who presents for follow-up. Since last visit patient has undergone bone marrow biopsy with no pathogenic mutation noted, normal cytogenetics, negative AML FISH. Patient notes continued weight loss, exercise and judicious diet. Patient continues to work and remain active. Weight 160 lbs -> 150 lbs over past three months while patient was going through cardiac rehab. 2 mile walk in AM, 2 mile run and 9 mile bike 6x / week. Eats good lunch, now lighter dinner due to meetings. Playing golf as well. Overall notes good energy level, no shortness or breath or chest pains.     Patient presents to clinic with wife Sharon.  ECOG Performance Status is 0.    ROS:  A complete 12-point review of systems was reviewed and is negative except as mentioned above.     Past Medical History:   Past Medical History:   Diagnosis Date    Anticoagulant long-term use     Coronary artery disease involving native coronary artery of native heart without angina pectoris 03/20/2019    Left heart cath 9/10/2007: 30% mid-LAD, 60% ostial D1, LCX with  40% mid, RCA with 30% mid-stenosis, 100% PLB.    Diverticulosis, sigmoid     MILD     Gout     Hyperlipidemia     Hypertension     Hypothyroidism     Ischemic cardiomyopathy 12/2022    Lentigo maligna 02/26/2019    Personal history of colonic polyps 01/22/2019        Allergies:   Review of patient's allergies indicates:  No Known Allergies     Medications:   Current Outpatient Medications   Medication Sig Dispense Refill    alfuzosin (UROXATRAL) 10 mg Tb24 TAKE 1 TABLET (10 MG TOTAL) BY MOUTH DAILY WITH BREAKFAST. 30 tablet 9    allopurinoL (ZYLOPRIM) 300 MG tablet take 1 tablet by mouth daily 30 tablet 6    levothyroxine (SYNTHROID) 50 MCG tablet Take 1 tablet (50 mcg total) by mouth before breakfast. 30 tablet 11    losartan (COZAAR) 50 MG tablet take 1 tablet by mouth daily 30 tablet 6    potassium citrate (UROCIT-K) 10 mEq (1,080 mg) TbSR TAKE ONE TABLET (10 MEQ TOTAL) BY MOUTH TWICE A DAY WITH MEALS. 60 tablet 11    pravastatin (PRAVACHOL) 80 MG tablet take 1 tablet by mouth every evening 30 tablet 8    ALPRAZolam (XANAX) 0.5 MG tablet Take 1 tablet (0.5 mg total) by mouth On call Procedure (Take one dose 1 hr before procedure, if no effects felt can repeat dose 15 - 30 minutes prior to procedure). 2 tablet 0    aspirin (ECOTRIN) 81 MG EC tablet Take 1 tablet (81 mg total) by mouth once daily.  0    BRILINTA 90 mg tablet take 1 tablet by mouth 2 times daily **do not stop unless instructed by md** 60 tablet 6     No current facility-administered medications for this visit.        Physical Exam:   Vitals:    03/26/24 0959   BP: 99/62   Pulse: 65   Resp: 16   Temp: 97.3 °F (36.3 °C)       Physical Exam  Constitutional:       Appearance: Normal appearance.   HENT:      Head: Normocephalic and atraumatic.      Mouth/Throat:      Mouth: Mucous membranes are moist.   Eyes:      Extraocular Movements: Extraocular movements intact.      Pupils: Pupils are equal, round, and reactive to light.   Cardiovascular:      Rate  and Rhythm: Normal rate and regular rhythm.      Pulses: Normal pulses.      Heart sounds: No murmur heard.  Pulmonary:      Effort: Pulmonary effort is normal. No respiratory distress.      Breath sounds: Normal breath sounds.   Abdominal:      General: Abdomen is flat. There is no distension.      Palpations: Abdomen is soft.      Tenderness: There is no abdominal tenderness.   Musculoskeletal:         General: No swelling or tenderness. Normal range of motion.      Cervical back: Normal range of motion. No rigidity.   Skin:     General: Skin is warm and dry.      Coloration: Skin is not jaundiced.   Neurological:      General: No focal deficit present.      Mental Status: He is alert and oriented to person, place, and time.   Psychiatric:         Mood and Affect: Mood normal.         Behavior: Behavior normal.           Labs:   Lab Results   Component Value Date    WBC 4.69 03/13/2024    HGB 11.4 (L) 03/13/2024    HCT 35.6 (L) 03/13/2024    MCV 97 03/13/2024     (L) 03/13/2024       Lab Results   Component Value Date     03/13/2024    K 4.0 03/13/2024     03/13/2024    CO2 28 03/13/2024    BUN 22 03/13/2024    CREATININE 1.1 03/13/2024    ALBUMIN 3.8 03/13/2024    BILITOT 0.6 03/13/2024    ALKPHOS 110 03/13/2024    AST 32 03/13/2024    ALT 37 03/13/2024       Imaging:   X-Ray Abdomen AP Oblique And Cone  Narrative: EXAMINATION:  Three images of the abdomen    CLINICAL HISTORY:  Uric acid nephrolithiasis    COMPARISON:  09/20/2023    FINDINGS:  Numerous calcific densities project over both kidneys.  Largest is on the left measuring up to 12 mm, similar to the prior study.  Impression: Bilateral renal calculi    Electronically signed by: Helio Ramirez MD  Date:    03/26/2024  Time:    10:57            Diagnoses:       1. Pancytopenia    2. Benign prostatic hyperplasia, unspecified whether lower urinary tract symptoms present    3. Coronary artery disease involving native coronary artery of native  heart without angina pectoris    4. Nephrolithiasis    5. Abnormal bowel movement    6. Routine health maintenance    7. Hypotension, unspecified hypotension type    8. Nutritional anemia            Assessment and Plan:     # Pancytopenia -  Patient has had extensive work-up including nutritional studies and bone marrow biopsy, with negative cytogenetics, oncoheme NGS and AML FISH. In light of weight loss and myocardial infarction, patient's blood pressure had gradually lowered on anithypertensives. He does not routinely check BP at home. Today we discussed in the setting of symptomatic hypotension, BP 80s/50s today after standing -> 99/62 with sitting, that the most likely cause of cytopenias is bone marrow hypoperfusion. Also element of nutritional deficiency with decreased stainable iron on bone marrow biopsy. CT CAP reviewed, there is hepatomegaly and congestion can also contribute to lower blood counts.    Recommend to continue IV iron and stop Losartan 50 mg. If BP increases at home can resume at lower dose. Will communicate plan with patient's cardiologist. Repeat labs in 3m after anticipated normalization of blood pressure and perfusion of bone marrow.    # Hypotension - Likely due to weight loss and antihypertensives, recommend to stop Losartan 50 mg for now and will likely resume at lower dose pending home BPs    # VANI - Received iron sucrose 200 mg x5, complete course.     # BPH - PSA ~2, repeat annual with Dr. Prater     # Nephrolithiasis - Follows with ILYA Angela 3/26/24    # CAD - MI after Thanksgiving 11/2022 + PCI in one artery. Followed by dietary and exercise modifications - walks 2 miles, runs 2 miles and 9 mile bikeride 6x / week. Taking Losartan 50 mg, Statin, ASA + Brilinta.   - recommend to stop Losartan 50 mg at home, will resume at lower dose pending home BP / upcoming cardiology visit      # Irregular Bowel Mvmts - Resolved, normal colonoscopy outside of [facility] end of 2023.     #  RHM  - Weight loss 215 -> 150 lbs, likely due to degree of exercise (13 mi 6x / week + golf) and diet, UTD with age related cancer screenings.     he will return to clinic in 3m, but knows to call in the interim if symptoms change or should a problem arise.    Mackenzie Ramon MD  Hematology/Oncology  [facility]      Med Onc Chart Routing      Follow up with physician No follow up needed.   Follow up with MACKENZIE    Infusion scheduling note    Injection scheduling note    Labs CBC, CMP, ferritin and iron and TIBC   Scheduling:  Preferred lab:  Lab interval:  3m cbc, cmp, iron, ferritin   Imaging    Pharmacy appointment    Other referrals

## 2024-03-26 NOTE — Clinical Note
Hi - I saw Mr. Schoen today for cytopenias, I actually think it is due to hypotension and inadequate marrow perfusion. He had an MI and substantial unintentional weight loss (215 -> 150 lbs), so I am holding Losartan 50 mg today prior to your apt with him. BP 80s/50s today in clinic, improved to 99/62 with sitting. I told him to check at home and I'm happy to call in a lower ARB dose prior to his visit with you if BP increases.   Thanks, Mackenzie

## 2024-04-02 ENCOUNTER — TELEPHONE (OUTPATIENT)
Dept: INFUSION THERAPY | Facility: HOSPITAL | Age: 74
End: 2024-04-02
Payer: MEDICARE

## 2024-04-02 NOTE — TELEPHONE ENCOUNTER
----- Message from Tessa Doe sent at 4/2/2024 11:42 AM CDT -----  Type:  Reschedule Appointment Request    Caller is requesting to reschedule appointment.      Name of Caller:pt    When is the first available appointment?4/10  4:30    Symptoms:infusion    Would the patient rather a call back or a response via "[employer]? Call back    Best Call Back Number:[phone]      Additional Information: is hoping to reschedule that appt for some day but at 11am     Please call Back to advise. Thanks!

## 2024-04-02 NOTE — TELEPHONE ENCOUNTER
Spoke with the patient and let him know that his appointment day did not have an earlier appointment but going forward he can request earlier appointment

## 2024-04-03 ENCOUNTER — INFUSION (OUTPATIENT)
Dept: INFUSION THERAPY | Facility: HOSPITAL | Age: 74
End: 2024-04-03
Attending: STUDENT IN AN ORGANIZED HEALTH CARE EDUCATION/TRAINING PROGRAM
Payer: MEDICARE

## 2024-04-03 VITALS
HEIGHT: 72 IN | TEMPERATURE: 98 F | SYSTOLIC BLOOD PRESSURE: 130 MMHG | HEART RATE: 48 BPM | WEIGHT: 155.88 LBS | DIASTOLIC BLOOD PRESSURE: 74 MMHG | RESPIRATION RATE: 18 BRPM | BODY MASS INDEX: 21.11 KG/M2

## 2024-04-03 DIAGNOSIS — D50.0 IRON DEFICIENCY ANEMIA DUE TO CHRONIC BLOOD LOSS: Primary | ICD-10-CM

## 2024-04-03 PROCEDURE — 25000003 PHARM REV CODE 250: Mod: PN | Performed by: STUDENT IN AN ORGANIZED HEALTH CARE EDUCATION/TRAINING PROGRAM

## 2024-04-03 PROCEDURE — 96374 THER/PROPH/DIAG INJ IV PUSH: CPT | Mod: PN

## 2024-04-03 PROCEDURE — 63600175 PHARM REV CODE 636 W HCPCS: Mod: PN | Performed by: STUDENT IN AN ORGANIZED HEALTH CARE EDUCATION/TRAINING PROGRAM

## 2024-04-03 RX ORDER — DIPHENHYDRAMINE HYDROCHLORIDE 50 MG/ML
50 INJECTION INTRAMUSCULAR; INTRAVENOUS ONCE AS NEEDED
Status: DISCONTINUED | OUTPATIENT
Start: 2024-04-03 | End: 2024-04-03 | Stop reason: HOSPADM

## 2024-04-03 RX ORDER — EPINEPHRINE 0.3 MG/.3ML
0.3 INJECTION SUBCUTANEOUS ONCE AS NEEDED
Status: DISCONTINUED | OUTPATIENT
Start: 2024-04-03 | End: 2024-04-03 | Stop reason: HOSPADM

## 2024-04-03 RX ORDER — HEPARIN 100 UNIT/ML
500 SYRINGE INTRAVENOUS
Status: DISCONTINUED | OUTPATIENT
Start: 2024-04-03 | End: 2024-04-03 | Stop reason: HOSPADM

## 2024-04-03 RX ORDER — SODIUM CHLORIDE 0.9 % (FLUSH) 0.9 %
10 SYRINGE (ML) INJECTION
Status: DISCONTINUED | OUTPATIENT
Start: 2024-04-03 | End: 2024-04-03 | Stop reason: HOSPADM

## 2024-04-03 RX ADMIN — SODIUM CHLORIDE: 9 INJECTION, SOLUTION INTRAVENOUS at 09:04

## 2024-04-03 RX ADMIN — IRON SUCROSE 200 MG: 20 INJECTION, SOLUTION INTRAVENOUS at 09:04

## 2024-04-03 NOTE — PLAN OF CARE
Tolerated venofer well.  Observed pt 30 minutes post infusion, no reactions noted.  No questions or concerns at this time.  Ambulated off unit in NAD.  
supervision

## 2024-04-09 ENCOUNTER — TELEPHONE (OUTPATIENT)
Dept: INFUSION THERAPY | Facility: HOSPITAL | Age: 74
End: 2024-04-09
Payer: MEDICARE

## 2024-04-09 NOTE — TELEPHONE ENCOUNTER
LVM to let the patient know that infusion did not have any of the times the patient is requesting.

## 2024-04-09 NOTE — TELEPHONE ENCOUNTER
----- Message from Vidya Morocho, Patient Care Assistant sent at 4/8/2024  1:57 PM CDT -----  Type: Needs Medical Advice  Who Called:  Sherif    Chandrakant Call Back Number: [phone]    Additional Information: renuka wants to know if he can move his 4/10  infusion to 11:00 or 1;30 that same day if possible  , please call to further discuss thank you .

## 2024-04-10 ENCOUNTER — INFUSION (OUTPATIENT)
Dept: INFUSION THERAPY | Facility: HOSPITAL | Age: 74
End: 2024-04-10
Attending: STUDENT IN AN ORGANIZED HEALTH CARE EDUCATION/TRAINING PROGRAM
Payer: MEDICARE

## 2024-04-10 VITALS
WEIGHT: 155.88 LBS | HEART RATE: 51 BPM | SYSTOLIC BLOOD PRESSURE: 106 MMHG | RESPIRATION RATE: 18 BRPM | TEMPERATURE: 98 F | HEIGHT: 72 IN | BODY MASS INDEX: 21.11 KG/M2 | DIASTOLIC BLOOD PRESSURE: 58 MMHG

## 2024-04-10 DIAGNOSIS — D50.0 IRON DEFICIENCY ANEMIA DUE TO CHRONIC BLOOD LOSS: Primary | ICD-10-CM

## 2024-04-10 PROCEDURE — 96374 THER/PROPH/DIAG INJ IV PUSH: CPT | Mod: PN

## 2024-04-10 PROCEDURE — 25000003 PHARM REV CODE 250: Mod: PN | Performed by: STUDENT IN AN ORGANIZED HEALTH CARE EDUCATION/TRAINING PROGRAM

## 2024-04-10 PROCEDURE — 63600175 PHARM REV CODE 636 W HCPCS: Mod: PN | Performed by: STUDENT IN AN ORGANIZED HEALTH CARE EDUCATION/TRAINING PROGRAM

## 2024-04-10 PROCEDURE — A4216 STERILE WATER/SALINE, 10 ML: HCPCS | Mod: PN | Performed by: STUDENT IN AN ORGANIZED HEALTH CARE EDUCATION/TRAINING PROGRAM

## 2024-04-10 RX ORDER — DIPHENHYDRAMINE HYDROCHLORIDE 50 MG/ML
50 INJECTION INTRAMUSCULAR; INTRAVENOUS ONCE AS NEEDED
Status: DISCONTINUED | OUTPATIENT
Start: 2024-04-10 | End: 2024-04-10 | Stop reason: HOSPADM

## 2024-04-10 RX ORDER — SODIUM CHLORIDE 0.9 % (FLUSH) 0.9 %
10 SYRINGE (ML) INJECTION
Status: DISCONTINUED | OUTPATIENT
Start: 2024-04-10 | End: 2024-04-10 | Stop reason: HOSPADM

## 2024-04-10 RX ORDER — EPINEPHRINE 0.3 MG/.3ML
0.3 INJECTION SUBCUTANEOUS ONCE AS NEEDED
Status: DISCONTINUED | OUTPATIENT
Start: 2024-04-10 | End: 2024-04-10 | Stop reason: HOSPADM

## 2024-04-10 RX ADMIN — Medication 10 ML: at 12:04

## 2024-04-10 RX ADMIN — IRON SUCROSE 200 MG: 20 INJECTION, SOLUTION INTRAVENOUS at 11:04

## 2024-04-10 NOTE — PLAN OF CARE
Problem: Adult Inpatient Plan of Care  Goal: Patient-Specific Goal (Individualized)  Outcome: Ongoing, Progressing  Flowsheets (Taken 4/10/2024 1208)  Anxieties, Fears or Concerns: none voiced  Individualized Care Needs: education, covnersation, work, phone, blanket, pillow  Goal: Optimal Comfort and Wellbeing  Outcome: Ongoing, Progressing  Intervention: Provide Person-Centered Care  Flowsheets (Taken 4/10/2024 1208)  Trust Relationship/Rapport:   care explained   questions encouraged   choices provided   reassurance provided   emotional support provided   thoughts/feelings acknowledged   empathic listening provided   questions answered     Problem: Fatigue  Goal: Improved Activity Tolerance  Outcome: Ongoing, Progressing  Intervention: Promote Improved Energy  Flowsheets (Taken 4/10/2024 1208)  Fatigue Management:   fatigue-related activity identified   paced activity encouraged   frequent rest breaks encouraged  Sleep/Rest Enhancement:   therapeutic touch utilized   noise level reduced   consistent schedule promoted   relaxation techniques promoted  Activity Management:   Ambulated -L4   Up in chair - L3

## 2024-05-17 ENCOUNTER — LAB VISIT (OUTPATIENT)
Dept: LAB | Facility: HOSPITAL | Age: 74
End: 2024-05-17
Attending: STUDENT IN AN ORGANIZED HEALTH CARE EDUCATION/TRAINING PROGRAM
Payer: MEDICARE

## 2024-05-17 DIAGNOSIS — D50.8 IRON DEFICIENCY ANEMIA SECONDARY TO INADEQUATE DIETARY IRON INTAKE: ICD-10-CM

## 2024-05-17 DIAGNOSIS — D61.818 PANCYTOPENIA: ICD-10-CM

## 2024-05-17 LAB
ALBUMIN SERPL BCP-MCNC: 3.8 G/DL (ref 3.5–5.2)
ALP SERPL-CCNC: 116 U/L (ref 55–135)
ALT SERPL W/O P-5'-P-CCNC: 53 U/L (ref 10–44)
ANION GAP SERPL CALC-SCNC: 8 MMOL/L (ref 8–16)
AST SERPL-CCNC: 31 U/L (ref 10–40)
BASOPHILS # BLD AUTO: 0.06 K/UL (ref 0–0.2)
BASOPHILS NFR BLD: 1.3 % (ref 0–1.9)
BILIRUB SERPL-MCNC: 0.8 MG/DL (ref 0.1–1)
BUN SERPL-MCNC: 21 MG/DL (ref 8–23)
CALCIUM SERPL-MCNC: 9.2 MG/DL (ref 8.7–10.5)
CHLORIDE SERPL-SCNC: 111 MMOL/L (ref 95–110)
CO2 SERPL-SCNC: 25 MMOL/L (ref 23–29)
CREAT SERPL-MCNC: 1 MG/DL (ref 0.5–1.4)
DIFFERENTIAL METHOD BLD: ABNORMAL
EOSINOPHIL # BLD AUTO: 0.1 K/UL (ref 0–0.5)
EOSINOPHIL NFR BLD: 2 % (ref 0–8)
ERYTHROCYTE [DISTWIDTH] IN BLOOD BY AUTOMATED COUNT: 14.6 % (ref 11.5–14.5)
EST. GFR  (NO RACE VARIABLE): >60 ML/MIN/1.73 M^2
FERRITIN SERPL-MCNC: 288 NG/ML (ref 20–300)
GLUCOSE SERPL-MCNC: 85 MG/DL (ref 70–110)
HCT VFR BLD AUTO: 35.5 % (ref 40–54)
HGB BLD-MCNC: 11.6 G/DL (ref 14–18)
IMM GRANULOCYTES # BLD AUTO: 0.01 K/UL (ref 0–0.04)
IMM GRANULOCYTES NFR BLD AUTO: 0.2 % (ref 0–0.5)
IRON SERPL-MCNC: 88 UG/DL (ref 45–160)
LDH SERPL L TO P-CCNC: 144 U/L (ref 110–260)
LYMPHOCYTES # BLD AUTO: 0.9 K/UL (ref 1–4.8)
LYMPHOCYTES NFR BLD: 18.9 % (ref 18–48)
MCH RBC QN AUTO: 31.7 PG (ref 27–31)
MCHC RBC AUTO-ENTMCNC: 32.7 G/DL (ref 32–36)
MCV RBC AUTO: 97 FL (ref 82–98)
MONOCYTES # BLD AUTO: 0.4 K/UL (ref 0.3–1)
MONOCYTES NFR BLD: 7.9 % (ref 4–15)
NEUTROPHILS # BLD AUTO: 3.2 K/UL (ref 1.8–7.7)
NEUTROPHILS NFR BLD: 69.7 % (ref 38–73)
NRBC BLD-RTO: 0 /100 WBC
PLATELET # BLD AUTO: 140 K/UL (ref 150–450)
PMV BLD AUTO: 9 FL (ref 9.2–12.9)
POTASSIUM SERPL-SCNC: 4.4 MMOL/L (ref 3.5–5.1)
PROT SERPL-MCNC: 6 G/DL (ref 6–8.4)
RBC # BLD AUTO: 3.66 M/UL (ref 4.6–6.2)
SATURATED IRON: 28 % (ref 20–50)
SODIUM SERPL-SCNC: 144 MMOL/L (ref 136–145)
TOTAL IRON BINDING CAPACITY: 318 UG/DL (ref 250–450)
TRANSFERRIN SERPL-MCNC: 215 MG/DL (ref 200–375)
URATE SERPL-MCNC: 3.3 MG/DL (ref 3.4–7)
WBC # BLD AUTO: 4.55 K/UL (ref 3.9–12.7)

## 2024-05-17 PROCEDURE — 83615 LACTATE (LD) (LDH) ENZYME: CPT | Mod: PN | Performed by: STUDENT IN AN ORGANIZED HEALTH CARE EDUCATION/TRAINING PROGRAM

## 2024-05-17 PROCEDURE — 80053 COMPREHEN METABOLIC PANEL: CPT | Mod: PN | Performed by: STUDENT IN AN ORGANIZED HEALTH CARE EDUCATION/TRAINING PROGRAM

## 2024-05-17 PROCEDURE — 36415 COLL VENOUS BLD VENIPUNCTURE: CPT | Mod: PN | Performed by: STUDENT IN AN ORGANIZED HEALTH CARE EDUCATION/TRAINING PROGRAM

## 2024-05-17 PROCEDURE — 83540 ASSAY OF IRON: CPT | Performed by: STUDENT IN AN ORGANIZED HEALTH CARE EDUCATION/TRAINING PROGRAM

## 2024-05-17 PROCEDURE — 82728 ASSAY OF FERRITIN: CPT | Performed by: STUDENT IN AN ORGANIZED HEALTH CARE EDUCATION/TRAINING PROGRAM

## 2024-05-17 PROCEDURE — 84550 ASSAY OF BLOOD/URIC ACID: CPT | Mod: PN | Performed by: STUDENT IN AN ORGANIZED HEALTH CARE EDUCATION/TRAINING PROGRAM

## 2024-05-17 PROCEDURE — 85025 COMPLETE CBC W/AUTO DIFF WBC: CPT | Mod: PN | Performed by: STUDENT IN AN ORGANIZED HEALTH CARE EDUCATION/TRAINING PROGRAM

## 2024-05-19 NOTE — PROGRESS NOTES
[facility]  FOLLOW-UP VISIT.     Reason for visit: follow up    Best Contact Phone Number(s): [phone] (home)      Pancytopenia  10/2023: Referral for WBC 3.07, nrml differential, Hgb 11.3, MCV 98, plts 146k. Nutritional studies replete, copper wnl, pathology interpretation shows mild anisopoikilocytosis. CT Chest shows 4 mm left lower lobe pulmonary nodule, bilateral nonobstructing renal stones, prominence of seminal vesicles, prostatomegaly  1/2024: Bone marrow biopsy shows normocellular marrow (30-40%), with no blasts, trilineage hematopoiesis with no dyspoiesis and focally increased megakaryocytes, decreased stainable iron, mildly increase reticulin fibrosis, negative Oncoheme NGS, negative AML FISH, normal cytogenetics     Normocytic Anemia  2007: Normocytic anemia has been noted with Hgb in 11 range     Interval History: Jeffrey Schoen is a 73 y.o. male with pancytopenia who presents for follow-up. Since last visit patient continues with active exercise regimen and well rounded diet. He has noted some increased dietary intake with ~4 lb weight gain. Checks BP and weight at home prior to exercise and at same time as antihypertensives, -136/60s-70s. Lghtheadedness and dizziness has resolved on lower dose of antihypertensive.    Interval visit with cardiology, EF showed 40-45% grade I diastolic dysfunction.     Patient presents to clinic with wife Sharon.  ECOG Performance Status is 0.    ROS:  A complete 12-point review of systems was reviewed and is negative except as mentioned above.     Past Medical History:   Past Medical History:   Diagnosis Date    Anticoagulant long-term use     Coronary artery disease involving native coronary artery of native heart without angina pectoris 03/20/2019    Left heart cath 9/10/2007: 30% mid-LAD, 60% ostial D1, LCX with 40% mid, RCA with 30% mid-stenosis, 100% PLB.    Diverticulosis, sigmoid     MILD     Gout     Hyperlipidemia      Hypertension     Hypothyroidism     Ischemic cardiomyopathy 12/2022    Lentigo maligna 02/26/2019    Personal history of colonic polyps 01/22/2019        Allergies:   Review of patient's allergies indicates:  No Known Allergies     Medications:   Current Outpatient Medications   Medication Sig Dispense Refill    alfuzosin (UROXATRAL) 10 mg Tb24 TAKE 1 TABLET (10 MG TOTAL) BY MOUTH DAILY WITH BREAKFAST. 30 tablet 9    allopurinoL (ZYLOPRIM) 300 MG tablet take 1 tablet by mouth daily 30 tablet 6    levothyroxine (SYNTHROID) 50 MCG tablet Take 1 tablet (50 mcg total) by mouth before breakfast. 30 tablet 11    losartan (COZAAR) 25 MG tablet 1/2 tab daily for hypertension 45 tablet 3    potassium citrate (UROCIT-K) 10 mEq (1,080 mg) TbSR TAKE ONE TABLET (10 MEQ TOTAL) BY MOUTH TWICE A DAY WITH MEALS. 60 tablet 11    pravastatin (PRAVACHOL) 80 MG tablet take 1 tablet by mouth every evening 30 tablet 8    aspirin (ECOTRIN) 81 MG EC tablet Take 1 tablet (81 mg total) by mouth once daily.  0     No current facility-administered medications for this visit.        Physical Exam:   Vitals:    05/21/24 0828   BP: (!) 100/58   Pulse: 60   Resp: 16   Temp: 97.6 °F (36.4 °C)         Physical Exam  Constitutional:       Appearance: Normal appearance.   HENT:      Head: Normocephalic and atraumatic.      Mouth/Throat:      Mouth: Mucous membranes are moist.   Eyes:      Extraocular Movements: Extraocular movements intact.      Pupils: Pupils are equal, round, and reactive to light.   Cardiovascular:      Rate and Rhythm: Normal rate and regular rhythm.      Pulses: Normal pulses.      Heart sounds: No murmur heard.  Pulmonary:      Effort: Pulmonary effort is normal. No respiratory distress.      Breath sounds: Normal breath sounds.   Abdominal:      General: Abdomen is flat. There is no distension.      Palpations: Abdomen is soft.      Tenderness: There is no abdominal tenderness.   Musculoskeletal:         General: No  swelling or tenderness. Normal range of motion.      Cervical back: Normal range of motion. No rigidity.   Skin:     General: Skin is warm and dry.      Coloration: Skin is not jaundiced.   Neurological:      General: No focal deficit present.      Mental Status: He is alert and oriented to person, place, and time.   Psychiatric:         Mood and Affect: Mood normal.         Behavior: Behavior normal.         Labs:   Lab Results   Component Value Date    WBC 4.55 05/17/2024    HGB 11.6 (L) 05/17/2024    HCT 35.5 (L) 05/17/2024    MCV 97 05/17/2024     (L) 05/17/2024       Lab Results   Component Value Date     05/17/2024    K 4.4 05/17/2024     (H) 05/17/2024    CO2 25 05/17/2024    BUN 21 05/17/2024    CREATININE 1.0 05/17/2024    ALBUMIN 3.8 05/17/2024    BILITOT 0.8 05/17/2024    ALKPHOS 116 05/17/2024    AST 31 05/17/2024    ALT 53 (H) 05/17/2024       Imaging:   Echo    Left Ventricle: The left ventricle is mildly dilated. Normal wall   thickness. There is mildly reduced systolic function with a visually   estimated ejection fraction of 40 - 45%. Grade I diastolic dysfunction.    Right Ventricle: Normal right ventricular cavity size. Wall thickness   is normal. Right ventricle wall motion  is normal. Systolic function is   normal.    Aortic Valve: There is moderate to severe aortic regurgitation.    IVC/SVC: Elevated venous pressure at 15 mmHg.            Diagnoses:       1. Nutritional anemia    2. Thrombocytopenia    3. Orthostatic hypotension    4. Iron deficiency anemia secondary to inadequate dietary iron intake    5. Benign prostatic hyperplasia, unspecified whether lower urinary tract symptoms present    6. Nephrolithiasis    7. Coronary artery disease involving native coronary artery of native heart without angina pectoris    8. Abnormal bowel movement    9. Routine health maintenance              Assessment and Plan:     # Anemia / Thrombocytopenia -  Patient has had extensive  work-up including nutritional studies and bone marrow biopsy, with negative cytogenetics, oncoheme NGS and AML FISH. Completed course of IV iron. Mr. Schoen continues with significant exercise regimen and strict dietary intake with minimal Na intake. I suspected that continued low blood pressures throughout the day is contributing to marrow hypoproliferation with BP in clinic 100/58 and recent echo showing 40-45% EF.     Recommend to continue well rounded diet, and monitor response to IV iron. Will hold Losartan and monitor BP off of antihypertensives, if SBP >120 will resume at Losartan 12.5 mg daily.     We discussed trajectory of cytopenias, if no response to improved marrow perfusion and liberation of diet may consider repeat bone marrow biopsy in the future.     # Hypotension - Likely due to weight loss and antihypertensives, recommend to stop Losartan 25 mg for now. If SBP increases above 120 may resume at Losartan 12.5 mg daily    # VANI - Received iron sucrose 200 mg x8 2/2024 - 4/2024    # BPH - PSA ~2, repeat annual with Dr. Prater. Takes alfuzosin daily     # Nephrolithiasis - Follows with ILYA Angela 3/26/24    # CAD - MI after Thanksgiving 11/2022 + PCI in one artery. Followed by dietary and exercise modifications - walks 2 miles, runs 2 miles and 9 mile bikeride 6x / week. Taking Losartan 25 mg (now holding), Statin, ASA + Brilinta.   - recent reduced EF to 40-45%, follows with cardiology. Will modify volume status in effort to increase EF by stopping antihypertensives and increasing Na intake     # Irregular Bowel Mvmts - Resolved, normal colonoscopy outside of [facility] end of 2023.     # RHM  - Weight loss 215 -> 160 lbs, likely due to degree of exercise (13 mi 6x / week + golf) and diet, UTD with age related cancer screenings.     he will return to clinic in 4m, but knows to call in the interim if symptoms change or should a problem arise.    Mackenzie Ramon MD  Hematology/Oncology  [facility]      Med Onc Chart Routing      Follow up with physician 4 months. kieran 9/2024   Follow up with MACKENZIE    Infusion scheduling note    Injection scheduling note    Labs CBC, CMP and iron and TIBC   Scheduling:  Preferred lab:  Lab interval:  lab apt 1w prior to clinic visit: cbc, cmp, iron, ferritin   Imaging    Pharmacy appointment    Other referrals

## 2024-05-21 ENCOUNTER — OFFICE VISIT (OUTPATIENT)
Dept: HEMATOLOGY/ONCOLOGY | Facility: CLINIC | Age: 74
End: 2024-05-21
Payer: MEDICARE

## 2024-05-21 VITALS
SYSTOLIC BLOOD PRESSURE: 100 MMHG | DIASTOLIC BLOOD PRESSURE: 58 MMHG | TEMPERATURE: 98 F | HEIGHT: 72 IN | HEART RATE: 60 BPM | BODY MASS INDEX: 21.68 KG/M2 | OXYGEN SATURATION: 98 % | RESPIRATION RATE: 16 BRPM | WEIGHT: 160.06 LBS

## 2024-05-21 DIAGNOSIS — I25.10 CORONARY ARTERY DISEASE INVOLVING NATIVE CORONARY ARTERY OF NATIVE HEART WITHOUT ANGINA PECTORIS: ICD-10-CM

## 2024-05-21 DIAGNOSIS — Z00.00 ROUTINE HEALTH MAINTENANCE: ICD-10-CM

## 2024-05-21 DIAGNOSIS — I95.1 ORTHOSTATIC HYPOTENSION: ICD-10-CM

## 2024-05-21 DIAGNOSIS — D53.9 NUTRITIONAL ANEMIA: Primary | ICD-10-CM

## 2024-05-21 DIAGNOSIS — D69.6 THROMBOCYTOPENIA: ICD-10-CM

## 2024-05-21 DIAGNOSIS — R19.8 ABNORMAL BOWEL MOVEMENT: ICD-10-CM

## 2024-05-21 DIAGNOSIS — N40.0 BENIGN PROSTATIC HYPERPLASIA, UNSPECIFIED WHETHER LOWER URINARY TRACT SYMPTOMS PRESENT: ICD-10-CM

## 2024-05-21 DIAGNOSIS — D50.8 IRON DEFICIENCY ANEMIA SECONDARY TO INADEQUATE DIETARY IRON INTAKE: ICD-10-CM

## 2024-05-21 DIAGNOSIS — N20.0 NEPHROLITHIASIS: ICD-10-CM

## 2024-05-21 PROBLEM — D70.9 NEUTROPENIA, UNSPECIFIED TYPE: Status: RESOLVED | Noted: 2023-09-30 | Resolved: 2024-05-21

## 2024-05-21 PROCEDURE — 4010F ACE/ARB THERAPY RXD/TAKEN: CPT | Mod: CPTII,S$GLB,, | Performed by: STUDENT IN AN ORGANIZED HEALTH CARE EDUCATION/TRAINING PROGRAM

## 2024-05-21 PROCEDURE — 1160F RVW MEDS BY RX/DR IN RCRD: CPT | Mod: CPTII,S$GLB,, | Performed by: STUDENT IN AN ORGANIZED HEALTH CARE EDUCATION/TRAINING PROGRAM

## 2024-05-21 PROCEDURE — 3288F FALL RISK ASSESSMENT DOCD: CPT | Mod: CPTII,S$GLB,, | Performed by: STUDENT IN AN ORGANIZED HEALTH CARE EDUCATION/TRAINING PROGRAM

## 2024-05-21 PROCEDURE — 1159F MED LIST DOCD IN RCRD: CPT | Mod: CPTII,S$GLB,, | Performed by: STUDENT IN AN ORGANIZED HEALTH CARE EDUCATION/TRAINING PROGRAM

## 2024-05-21 PROCEDURE — 3078F DIAST BP <80 MM HG: CPT | Mod: CPTII,S$GLB,, | Performed by: STUDENT IN AN ORGANIZED HEALTH CARE EDUCATION/TRAINING PROGRAM

## 2024-05-21 PROCEDURE — 99214 OFFICE O/P EST MOD 30 MIN: CPT | Mod: S$GLB,,, | Performed by: STUDENT IN AN ORGANIZED HEALTH CARE EDUCATION/TRAINING PROGRAM

## 2024-05-21 PROCEDURE — 3074F SYST BP LT 130 MM HG: CPT | Mod: CPTII,S$GLB,, | Performed by: STUDENT IN AN ORGANIZED HEALTH CARE EDUCATION/TRAINING PROGRAM

## 2024-05-21 PROCEDURE — 3008F BODY MASS INDEX DOCD: CPT | Mod: CPTII,S$GLB,, | Performed by: STUDENT IN AN ORGANIZED HEALTH CARE EDUCATION/TRAINING PROGRAM

## 2024-05-21 PROCEDURE — 1126F AMNT PAIN NOTED NONE PRSNT: CPT | Mod: CPTII,S$GLB,, | Performed by: STUDENT IN AN ORGANIZED HEALTH CARE EDUCATION/TRAINING PROGRAM

## 2024-05-21 PROCEDURE — 1101F PT FALLS ASSESS-DOCD LE1/YR: CPT | Mod: CPTII,S$GLB,, | Performed by: STUDENT IN AN ORGANIZED HEALTH CARE EDUCATION/TRAINING PROGRAM

## 2024-05-21 PROCEDURE — 99999 PR PBB SHADOW E&M-EST. PATIENT-LVL III: CPT | Mod: PBBFAC,,, | Performed by: STUDENT IN AN ORGANIZED HEALTH CARE EDUCATION/TRAINING PROGRAM

## 2024-06-19 ENCOUNTER — TELEPHONE (OUTPATIENT)
Dept: HEMATOLOGY/ONCOLOGY | Facility: CLINIC | Age: 74
End: 2024-06-19
Payer: MEDICARE

## 2024-06-19 ENCOUNTER — PATIENT MESSAGE (OUTPATIENT)
Dept: HEMATOLOGY/ONCOLOGY | Facility: CLINIC | Age: 74
End: 2024-06-19
Payer: MEDICARE

## 2024-09-17 ENCOUNTER — LAB VISIT (OUTPATIENT)
Dept: LAB | Facility: HOSPITAL | Age: 74
End: 2024-09-17
Attending: STUDENT IN AN ORGANIZED HEALTH CARE EDUCATION/TRAINING PROGRAM
Payer: MEDICARE

## 2024-09-17 DIAGNOSIS — D53.9 NUTRITIONAL ANEMIA: ICD-10-CM

## 2024-09-17 LAB
ALBUMIN SERPL BCP-MCNC: 3.6 G/DL (ref 3.5–5.2)
ALP SERPL-CCNC: 103 U/L (ref 55–135)
ALT SERPL W/O P-5'-P-CCNC: 101 U/L (ref 10–44)
ANION GAP SERPL CALC-SCNC: 9 MMOL/L (ref 8–16)
AST SERPL-CCNC: 33 U/L (ref 10–40)
BASOPHILS # BLD AUTO: 0.04 K/UL (ref 0–0.2)
BASOPHILS NFR BLD: 0.7 % (ref 0–1.9)
BILIRUB SERPL-MCNC: 1 MG/DL (ref 0.1–1)
BUN SERPL-MCNC: 23 MG/DL (ref 8–23)
CALCIUM SERPL-MCNC: 9 MG/DL (ref 8.7–10.5)
CHLORIDE SERPL-SCNC: 107 MMOL/L (ref 95–110)
CO2 SERPL-SCNC: 26 MMOL/L (ref 23–29)
CREAT SERPL-MCNC: 1 MG/DL (ref 0.5–1.4)
DIFFERENTIAL METHOD BLD: ABNORMAL
EOSINOPHIL # BLD AUTO: 0.2 K/UL (ref 0–0.5)
EOSINOPHIL NFR BLD: 2.9 % (ref 0–8)
ERYTHROCYTE [DISTWIDTH] IN BLOOD BY AUTOMATED COUNT: 14.1 % (ref 11.5–14.5)
EST. GFR  (NO RACE VARIABLE): >60 ML/MIN/1.73 M^2
FERRITIN SERPL-MCNC: 329 NG/ML (ref 20–300)
FOLATE SERPL-MCNC: 11.6 NG/ML (ref 4–24)
GLUCOSE SERPL-MCNC: 82 MG/DL (ref 70–110)
HCT VFR BLD AUTO: 38.9 % (ref 40–54)
HGB BLD-MCNC: 12.9 G/DL (ref 14–18)
IMM GRANULOCYTES # BLD AUTO: 0.09 K/UL (ref 0–0.04)
IMM GRANULOCYTES NFR BLD AUTO: 1.5 % (ref 0–0.5)
IRON SERPL-MCNC: 106 UG/DL (ref 45–160)
LYMPHOCYTES # BLD AUTO: 1.1 K/UL (ref 1–4.8)
LYMPHOCYTES NFR BLD: 19.4 % (ref 18–48)
MCH RBC QN AUTO: 31.8 PG (ref 27–31)
MCHC RBC AUTO-ENTMCNC: 33.2 G/DL (ref 32–36)
MCV RBC AUTO: 96 FL (ref 82–98)
MONOCYTES # BLD AUTO: 0.5 K/UL (ref 0.3–1)
MONOCYTES NFR BLD: 8.7 % (ref 4–15)
NEUTROPHILS # BLD AUTO: 3.9 K/UL (ref 1.8–7.7)
NEUTROPHILS NFR BLD: 66.8 % (ref 38–73)
NRBC BLD-RTO: 0 /100 WBC
PLATELET # BLD AUTO: 200 K/UL (ref 150–450)
PMV BLD AUTO: 8.7 FL (ref 9.2–12.9)
POTASSIUM SERPL-SCNC: 4.4 MMOL/L (ref 3.5–5.1)
PROT SERPL-MCNC: 5.9 G/DL (ref 6–8.4)
RBC # BLD AUTO: 4.06 M/UL (ref 4.6–6.2)
SATURATED IRON: 36 % (ref 20–50)
SODIUM SERPL-SCNC: 142 MMOL/L (ref 136–145)
TOTAL IRON BINDING CAPACITY: 297 UG/DL (ref 250–450)
TRANSFERRIN SERPL-MCNC: 201 MG/DL (ref 200–375)
VIT B12 SERPL-MCNC: 582 PG/ML (ref 210–950)
WBC # BLD AUTO: 5.89 K/UL (ref 3.9–12.7)

## 2024-09-17 PROCEDURE — 36415 COLL VENOUS BLD VENIPUNCTURE: CPT | Mod: PN | Performed by: STUDENT IN AN ORGANIZED HEALTH CARE EDUCATION/TRAINING PROGRAM

## 2024-09-17 PROCEDURE — 80053 COMPREHEN METABOLIC PANEL: CPT | Mod: PN | Performed by: STUDENT IN AN ORGANIZED HEALTH CARE EDUCATION/TRAINING PROGRAM

## 2024-09-17 PROCEDURE — 85025 COMPLETE CBC W/AUTO DIFF WBC: CPT | Mod: PN | Performed by: STUDENT IN AN ORGANIZED HEALTH CARE EDUCATION/TRAINING PROGRAM

## 2024-09-17 PROCEDURE — 82728 ASSAY OF FERRITIN: CPT | Performed by: STUDENT IN AN ORGANIZED HEALTH CARE EDUCATION/TRAINING PROGRAM

## 2024-09-17 PROCEDURE — 83540 ASSAY OF IRON: CPT | Performed by: STUDENT IN AN ORGANIZED HEALTH CARE EDUCATION/TRAINING PROGRAM

## 2024-09-17 PROCEDURE — 82607 VITAMIN B-12: CPT | Performed by: STUDENT IN AN ORGANIZED HEALTH CARE EDUCATION/TRAINING PROGRAM

## 2024-09-17 PROCEDURE — 82746 ASSAY OF FOLIC ACID SERUM: CPT | Performed by: STUDENT IN AN ORGANIZED HEALTH CARE EDUCATION/TRAINING PROGRAM

## 2024-09-25 ENCOUNTER — OFFICE VISIT (OUTPATIENT)
Dept: HEMATOLOGY/ONCOLOGY | Facility: CLINIC | Age: 74
End: 2024-09-25
Payer: MEDICARE

## 2024-09-25 VITALS
HEIGHT: 72 IN | BODY MASS INDEX: 21.72 KG/M2 | DIASTOLIC BLOOD PRESSURE: 61 MMHG | SYSTOLIC BLOOD PRESSURE: 120 MMHG | HEART RATE: 52 BPM | WEIGHT: 160.38 LBS

## 2024-09-25 DIAGNOSIS — I25.10 CORONARY ARTERY DISEASE INVOLVING NATIVE CORONARY ARTERY OF NATIVE HEART WITHOUT ANGINA PECTORIS: ICD-10-CM

## 2024-09-25 DIAGNOSIS — Z00.00 ROUTINE HEALTH MAINTENANCE: ICD-10-CM

## 2024-09-25 DIAGNOSIS — D53.9 NUTRITIONAL ANEMIA: ICD-10-CM

## 2024-09-25 DIAGNOSIS — N20.0 NEPHROLITHIASIS: ICD-10-CM

## 2024-09-25 DIAGNOSIS — D69.6 THROMBOCYTOPENIA: ICD-10-CM

## 2024-09-25 DIAGNOSIS — R19.8 ABNORMAL BOWEL MOVEMENT: ICD-10-CM

## 2024-09-25 DIAGNOSIS — N40.0 BENIGN PROSTATIC HYPERPLASIA, UNSPECIFIED WHETHER LOWER URINARY TRACT SYMPTOMS PRESENT: ICD-10-CM

## 2024-09-25 DIAGNOSIS — R74.8 ELEVATED LIVER ENZYMES: Primary | ICD-10-CM

## 2024-09-25 PROBLEM — D46.4 REFRACTORY ANEMIA, UNSPECIFIED: Status: ACTIVE | Noted: 2024-09-25

## 2024-09-25 PROBLEM — I25.118 ATHEROSCLEROTIC HEART DISEASE OF NATIVE CORONARY ARTERY WITH OTHER FORMS OF ANGINA PECTORIS: Status: ACTIVE | Noted: 2022-03-21

## 2024-09-25 PROCEDURE — G2211 COMPLEX E/M VISIT ADD ON: HCPCS | Mod: S$GLB,,, | Performed by: STUDENT IN AN ORGANIZED HEALTH CARE EDUCATION/TRAINING PROGRAM

## 2024-09-25 PROCEDURE — 3288F FALL RISK ASSESSMENT DOCD: CPT | Mod: CPTII,S$GLB,, | Performed by: STUDENT IN AN ORGANIZED HEALTH CARE EDUCATION/TRAINING PROGRAM

## 2024-09-25 PROCEDURE — 99999 PR PBB SHADOW E&M-EST. PATIENT-LVL III: CPT | Mod: PBBFAC,,, | Performed by: STUDENT IN AN ORGANIZED HEALTH CARE EDUCATION/TRAINING PROGRAM

## 2024-09-25 PROCEDURE — 3008F BODY MASS INDEX DOCD: CPT | Mod: CPTII,S$GLB,, | Performed by: STUDENT IN AN ORGANIZED HEALTH CARE EDUCATION/TRAINING PROGRAM

## 2024-09-25 PROCEDURE — 1126F AMNT PAIN NOTED NONE PRSNT: CPT | Mod: CPTII,S$GLB,, | Performed by: STUDENT IN AN ORGANIZED HEALTH CARE EDUCATION/TRAINING PROGRAM

## 2024-09-25 PROCEDURE — 3074F SYST BP LT 130 MM HG: CPT | Mod: CPTII,S$GLB,, | Performed by: STUDENT IN AN ORGANIZED HEALTH CARE EDUCATION/TRAINING PROGRAM

## 2024-09-25 PROCEDURE — 99214 OFFICE O/P EST MOD 30 MIN: CPT | Mod: S$GLB,,, | Performed by: STUDENT IN AN ORGANIZED HEALTH CARE EDUCATION/TRAINING PROGRAM

## 2024-09-25 PROCEDURE — 3078F DIAST BP <80 MM HG: CPT | Mod: CPTII,S$GLB,, | Performed by: STUDENT IN AN ORGANIZED HEALTH CARE EDUCATION/TRAINING PROGRAM

## 2024-09-25 PROCEDURE — 1159F MED LIST DOCD IN RCRD: CPT | Mod: CPTII,S$GLB,, | Performed by: STUDENT IN AN ORGANIZED HEALTH CARE EDUCATION/TRAINING PROGRAM

## 2024-09-25 PROCEDURE — 1101F PT FALLS ASSESS-DOCD LE1/YR: CPT | Mod: CPTII,S$GLB,, | Performed by: STUDENT IN AN ORGANIZED HEALTH CARE EDUCATION/TRAINING PROGRAM

## 2024-09-25 PROCEDURE — 4010F ACE/ARB THERAPY RXD/TAKEN: CPT | Mod: CPTII,S$GLB,, | Performed by: STUDENT IN AN ORGANIZED HEALTH CARE EDUCATION/TRAINING PROGRAM

## 2024-09-25 NOTE — PROGRESS NOTES
[facility]  FOLLOW-UP VISIT.     Reason for visit: follow up    Best Contact Phone Number(s): [phone] (home)      Pancytopenia  10/2023: Referral for WBC 3.07, nrml differential, Hgb 11.3, MCV 98, plts 146k. Nutritional studies replete, copper wnl, pathology interpretation shows mild anisopoikilocytosis. CT Chest shows 4 mm left lower lobe pulmonary nodule, bilateral nonobstructing renal stones, prominence of seminal vesicles, prostatomegaly  1/2024: Bone marrow biopsy shows normocellular marrow (30-40%), with no blasts, trilineage hematopoiesis with no dyspoiesis and focally increased megakaryocytes, decreased stainable iron, mildly increase reticulin fibrosis, negative Oncoheme NGS, negative AML FISH, normal cytogenetics     Normocytic Anemia  2007: Normocytic anemia has been noted with Hgb in 11 range     Interval History: Jeffrey Schoen is a 73 y.o. male with history of pancytopenia.  who presents for follow-up. Since last visit patient has gained and maintained 5 lbs with more liberal diet. Continues with morning exercise routine. BP at home 120-130s / 60s-70s. No episodes of lightheadedness. Recent covid several weeks ago.     Patient presents to clinic with wife Sharon.  ECOG Performance Status is 0.    ROS:  A complete 12-point review of systems was reviewed and is negative except as mentioned above.     Past Medical History:   Past Medical History:   Diagnosis Date    Anticoagulant long-term use     Coronary artery disease involving native coronary artery of native heart without angina pectoris 03/20/2019    Left heart cath 9/10/2007: 30% mid-LAD, 60% ostial D1, LCX with 40% mid, RCA with 30% mid-stenosis, 100% PLB.    Diverticulosis, sigmoid     MILD     Gout     Hyperlipidemia     Hypertension     Hypothyroidism     Ischemic cardiomyopathy 12/2022    Lentigo maligna 02/26/2019    Personal history of colonic polyps 01/22/2019        Allergies:   Review of patient's allergies  indicates:  No Known Allergies     Medications:   Current Outpatient Medications   Medication Sig Dispense Refill    alfuzosin (UROXATRAL) 10 mg Tb24 TAKE 1 TABLET (10 MG TOTAL) BY MOUTH DAILY WITH BREAKFAST. 30 tablet 3    allopurinoL (ZYLOPRIM) 300 MG tablet take 1 tablet by mouth daily 30 tablet 3    levothyroxine (SYNTHROID) 50 MCG tablet Take 1 tablet (50 mcg total) by mouth before breakfast. 30 tablet 11    losartan (COZAAR) 25 MG tablet TAKE 1/2 TABLET BY MOUTH DAILY FOR HYPERTENSION 45 tablet 1    potassium citrate (UROCIT-K) 10 mEq (1,080 mg) TbSR TAKE ONE TABLET (10 MEQ TOTAL) BY MOUTH TWICE A DAY WITH MEALS. 60 tablet 11    pravastatin (PRAVACHOL) 80 MG tablet take 1 tablet by mouth every evening 30 tablet 8    aspirin (ECOTRIN) 81 MG EC tablet Take 1 tablet (81 mg total) by mouth once daily.  0     No current facility-administered medications for this visit.        Physical Exam:   Vitals:    09/25/24 0807   BP: 120/61   Pulse: (!) 52         Physical Exam  Constitutional:       Appearance: Normal appearance.   HENT:      Head: Normocephalic and atraumatic.      Mouth/Throat:      Mouth: Mucous membranes are moist.   Eyes:      Extraocular Movements: Extraocular movements intact.      Pupils: Pupils are equal, round, and reactive to light.   Cardiovascular:      Rate and Rhythm: Normal rate and regular rhythm.      Pulses: Normal pulses.      Heart sounds: No murmur heard.  Pulmonary:      Effort: Pulmonary effort is normal. No respiratory distress.      Breath sounds: Normal breath sounds.   Abdominal:      General: Abdomen is flat. There is no distension.      Palpations: Abdomen is soft.      Tenderness: There is no abdominal tenderness.   Musculoskeletal:         General: No swelling or tenderness. Normal range of motion.      Cervical back: Normal range of motion. No rigidity.   Skin:     General: Skin is warm and dry.      Coloration: Skin is not jaundiced.   Neurological:      General: No focal  deficit present.      Mental Status: He is alert and oriented to person, place, and time.   Psychiatric:         Mood and Affect: Mood normal.         Behavior: Behavior normal.           Labs:   Lab Results   Component Value Date    WBC 5.89 09/17/2024    HGB 12.9 (L) 09/17/2024    HCT 38.9 (L) 09/17/2024    MCV 96 09/17/2024     09/17/2024       Lab Results   Component Value Date     09/17/2024    K 4.4 09/17/2024     09/17/2024    CO2 26 09/17/2024    BUN 23 09/17/2024    CREATININE 1.0 09/17/2024    ALBUMIN 3.6 09/17/2024    BILITOT 1.0 09/17/2024    ALKPHOS 103 09/17/2024    AST 33 09/17/2024     (H) 09/17/2024       Imaging:   Echo    Left Ventricle: The left ventricle is mildly dilated. Normal wall   thickness. There is mildly reduced systolic function with a visually   estimated ejection fraction of 40 - 45%. Grade I diastolic dysfunction.    Right Ventricle: Normal right ventricular cavity size. Wall thickness   is normal. Right ventricle wall motion  is normal. Systolic function is   normal.    Aortic Valve: There is moderate to severe aortic regurgitation.    IVC/SVC: Elevated venous pressure at 15 mmHg.            Diagnoses:       No diagnosis found.            Assessment and Plan:     # Anemia / Thrombocytopenia -  Patient has had extensive work-up including nutritional studies and bone marrow biopsy, with negative cytogenetics, oncoheme NGS and AML FISH. Completed course of IV iron and has resolved episodes of hypotension with modification of diet and antihypertensive regimen. Suspect that episodes of hypotension and modified diet contributed to marrow hypoproliferation and cytopenias.    Hgb improving, neutrophilia with recent Covid, will monitor cbc in 3m.     # Elevated ALT - Negative hepatitis screen in the past, history of hepatomegaly or liver disease. , order abd US next available     # BPH - PSA ~2, repeat annual with Dr. Prater. Takes alfuzosin daily     #  Nephrolithiasis - Follows with ILYA Angela 3/26/24    # CAD - MI after Thanksgiving 11/2022 + PCI in one artery. Followed by dietary and exercise modifications - walks 2 miles, runs 2 miles and 9 mile bikeride 6x / week. Taking Losartan 25 mg (now holding), Statin, ASA + Brilinta.   - recent reduced EF to 40-45%, follows with cardiology. Will modify volume status in effort to increase EF by stopping antihypertensives and increasing Na intake     # RHM  - Weight loss 215 -> 160 lbs, likely due to degree of exercise (13 mi 6x / week + golf) and diet, UTD with age related cancer screenings.     he will return to clinic in 4m, but knows to call in the interim if symptoms change or should a problem arise.    Mackenzie Ramon MD  Hematology/Oncology  [facility]      Med Onc Chart Routing      Follow up with physician . Tristen 12/2024   Follow up with MACKENZIE    Infusion scheduling note    Injection scheduling note    Labs CBC and CMP   Scheduling:  Preferred lab:  Lab interval:  lab apt prior to visit   Imaging   Abd US next available   Pharmacy appointment    Other referrals

## 2024-09-27 ENCOUNTER — HOSPITAL ENCOUNTER (OUTPATIENT)
Dept: RADIOLOGY | Facility: HOSPITAL | Age: 74
Discharge: HOME | End: 2024-09-27
Attending: STUDENT IN AN ORGANIZED HEALTH CARE EDUCATION/TRAINING PROGRAM
Payer: MEDICARE

## 2024-09-27 DIAGNOSIS — R74.8 ELEVATED LIVER ENZYMES: ICD-10-CM

## 2024-09-27 PROCEDURE — 76700 US EXAM ABDOM COMPLETE: CPT | Mod: TC,PO

## 2024-09-27 PROCEDURE — 76700 US EXAM ABDOM COMPLETE: CPT | Mod: 26,,, | Performed by: RADIOLOGY

## 2024-09-30 ENCOUNTER — TELEPHONE (OUTPATIENT)
Dept: HEMATOLOGY/ONCOLOGY | Facility: CLINIC | Age: 74
End: 2024-09-30
Payer: MEDICARE

## 2024-09-30 NOTE — PROGRESS NOTES
Discussed US with no abnormal liver findings, will likely repeat cmp with PCP or cardiology prior to next visit to result out no abnormal liver enzyme trend.

## 2024-10-01 ENCOUNTER — TELEPHONE (OUTPATIENT)
Dept: HEMATOLOGY/ONCOLOGY | Facility: CLINIC | Age: 74
End: 2024-10-01
Payer: MEDICARE

## 2024-10-01 NOTE — TELEPHONE ENCOUNTER
See previous documentation from Dr Ramon. Handled in different encounter.    ----- Message from Tessa sent at 9/30/2024  5:09 PM CDT -----  Type:  Patient Returning Call    Who Called:pt      Who Left Message for Patient:Dr Ramon    Does the patient know what this is regarding?:yes    Would the patient rather a call back or a response via [facility]? Call back    Best Call Back Number:[phone]      Additional Information:      Please call Back to advise. Thanks!

## 2024-10-19 PROBLEM — D50.0 IRON DEFICIENCY ANEMIA DUE TO CHRONIC BLOOD LOSS: Chronic | Status: ACTIVE | Noted: 2024-02-09

## 2024-10-19 PROBLEM — D46.4 REFRACTORY ANEMIA, UNSPECIFIED: Chronic | Status: ACTIVE | Noted: 2024-09-25

## 2024-10-19 PROBLEM — D69.6 THROMBOCYTOPENIA: Chronic | Status: ACTIVE | Noted: 2024-01-11

## 2024-10-27 PROBLEM — R41.3 MEMORY DYSFUNCTION: Status: ACTIVE | Noted: 2024-10-27

## 2024-10-28 ENCOUNTER — TELEPHONE (OUTPATIENT)
Dept: NEUROLOGY | Facility: CLINIC | Age: 74
End: 2024-10-28
Payer: MEDICARE

## 2024-11-05 NOTE — PROGRESS NOTES
Name: Jeffrey Schoen  MRN: [id number]   [facility]: [id number]      Date: 11-6-24      Referring physician:  No referring provider defined for this encounter.    Subjective:        The patient location is: [place]    Visit type: audiovisual    Each patient to whom he or she provides medical services by telemedicine is:  (1) informed of the relationship between the physician and patient and the respective role of any other health care provider with respect to management of the patient; and (2) notified that he or she may decline to receive medical services by telemedicine and may withdraw from such care at any time.          Chief Complaint: memory     History of Present Illness (HPI):    Jeffrey Schoen is a 73 y.o. right-handed male with HTN, HLD, and hypothyroidism, iron def anemia, thrombocytopenia  presents today for an initial evaluation of  memory dysfunction  and is alone.       Memory   For the last 6-12 mos, no particular event that has been disturbing.   Sometime may not remember a name. Excuse for that is that I am very busy with a lot going on. Nothing alarming. Nothing extreme.    Practice law. Most would label me as bright and excellent memory.   Forgetfulness has bever been an issue.  Occasionally, names of persons should be able to recollect but would take 15-30 sec time frame to a a few minutes.   About 6 weeks ago, friendly with [facility]'t, head of [facility], and was having discussion with someone. For the life of me, I could not come up with his first or last name. Had not interacted with him for decades but could see his image.   The name did come to me 3-5 min later but could not imagine that I could not recall his  name. This upset me.     Asked my wife and the my two secretaries (same for 40+ years) if they noticed anything. Secretaries did not have anything to suggest that they were covering for me. They noted maybe I am not as good with names as once was.   Wife said she occasionally detected I was  forgetful about recollecting the name of a restaurant- more so the names of things.       Stilll very active in business, social, and family life. Sometimes wonder if too many things going on. Maybe I just overlaoded computer.     Had one significant misplace about  amonth ago. Stopped at bank and cashed a check $2500. Did this from the car. Gave them 's license and ret'd it to my wallet. Kept the envelop of cash on the seat. Made two other stop, including getting gas. When got home did not have the cash. Searched the car. Back tracked. They had videos of the two places. Presumably, envelope fell out of the car. Stll have not found that money.       Social History:    No children    Education:  Completed 4 years college + 3 years law school      Occupation:  Works FT as an     Smoke:  Never smoked    Exercise  Regularly     Alcohol:  Drink less now than ever before. Not had drink in 3-4 weeks. Drink socially now. May drink glass of wine if go out to dinner.   Drank more prior to two years ago. Had MI and had stents. Went on diet . Went from 215 to 160 lbs. He was never drinking daily before the MI but probably a little more. Prior to MI, he thinks he would drink 2 per week.   However, this weekend, he suspect he would have 3-4 when he tailgates.       Living Environment:  Dwelling- House and with stairs  Lives with spouse  Firearms- No    iADLs  Driving:  No difficulties  No accidents or moving violations.  No diff navigating or finding places.       Meal Prep/Planning:  Does not cook but enjoy grilling and no issues with this.     Managing Finances:  Manages independently  No issues with this.   No missed payments. Pay bill as soon as it comes in.  Balance check book manually.     Medication Administration:  Wife distributes meds in pill box. She has always done this.   She picks up refills.     Managing Appointments:  Independent  Always put in appt book or phone.     Housekeeping/  Laundry/Yard:   Take care of car and fueling.   Change light bulbs.   General tasks on weekend.    Technology:   Was never tech savvy but maintain skills to use phone, texting, computer. I am not sure I could have set up the appt for today.     ADLs  Bathing: Independent    Dressing: Independent    Toileting: Independent    Eating: Independent        Mood: No issues  May get upset or disturbed if something at office isn't going the way I think it should.     Behavior: No behavior issues    Hallucinations: Denies    Swallowing: Trouble swallowing pills (larger pills usually) more recently. He has not found this with food.     Tremor: No     Ambulation: No difficulty   Fell last week. Had echo 2 weeks ago. Saw cards last wed. Echo was not good. My heart is not working efficitnely as it was. Pretty much right back where I was after heart attack. As a restul, he changed the medicine and passed out. Ended up in the ER. They think it was the change of medicine and dehydration.   He was out a few minutes 2-4 min from what bystanders said.   As a resutl I will have angiogran 24.     Sleep:  No problems going to sleep. Don't sleep as long. Get by with 5-6 hours. Feel rested, not tired during the day.     Hearing  Had hearing test 3 years ago. Recommended that I might need aid in right year. Thought about it and decided to wait. Hearing is probably a little worse.       FMH DEMENTIA:  Maternal Aunt had dementia. Probably  in her early 80s. It was not Alzheimer's dementia.   No other FMH of dementia.       Review of Systems   Constitutional:  Negative for appetite change.   HENT:  Positive for hearing loss (he does nto wear hearing aids) and trouble swallowing. Negative for drooling.         Smell intact   Eyes:  Negative for visual disturbance (reading glasses).   Cardiovascular:  Negative for chest pain, palpitations and leg swelling.   Gastrointestinal:  Negative for constipation and diarrhea.   Genitourinary:          Problem starting stream. See urology.   No accidents.    Musculoskeletal:  Negative for gait problem.   Neurological:  Positive for syncope. Negative for tremors.   Hematological:  Negative for adenopathy.   Psychiatric/Behavioral:  Negative for dysphoric mood, hallucinations and sleep disturbance (no dream enactment).        Past Medical History: The patient  has a past medical history of Anticoagulant long-term use, Coronary artery disease involving native coronary artery of native heart without angina pectoris (03/20/2019), Diverticulosis, sigmoid, Gout, Hyperlipidemia, Hypertension, Hypothyroidism, Ischemic cardiomyopathy (12/2022), Lentigo maligna (02/26/2019), and Personal history of colonic polyps (01/22/2019).    Social History: The patient  reports that he has never smoked. He has never been exposed to tobacco smoke. He has never used smokeless tobacco. He reports current alcohol use of about 1.0 standard drink of alcohol per week. He reports that he does not use drugs.    Family History: Their family history includes Breast cancer in his mother; Cancer in his mother; Heart disease in his father; Stroke in his father.    Allergies: Patient has no known allergies.     Meds:   Current Outpatient Medications on File Prior to Visit   Medication Sig Dispense Refill    alfuzosin (UROXATRAL) 10 mg Tb24 TAKE 1 TABLET (10 MG TOTAL) BY MOUTH DAILY WITH BREAKFAST. 30 tablet 3    allopurinoL (ZYLOPRIM) 300 MG tablet take 1 tablet by mouth daily 30 tablet 3    aspirin (ECOTRIN) 325 MG EC tablet Take 325 mg by mouth once daily.      levothyroxine (SYNTHROID) 50 MCG tablet Take 1 tablet (50 mcg total) by mouth before breakfast. 30 tablet 11    potassium citrate (UROCIT-K) 10 mEq (1,080 mg) TbSR TAKE ONE TABLET (10 MEQ TOTAL) BY MOUTH TWICE A DAY WITH MEALS. 60 tablet 11    pravastatin (PRAVACHOL) 80 MG tablet take 1 tablet by mouth every evening 30 tablet 8    sacubitriL-valsartan (ENTRESTO) 24-26 mg per tablet Take 1  tablet by mouth 2 (two) times daily. 60 tablet 0     No current facility-administered medications on file prior to visit.       Objective:     Physical Exam:    Constitutional  Well-developed, well-nourished, appears stated age     ..Awake, alert, pleasant and cooperative.   Language spontaneous, fluent, very detailed.  RR equal and unlabored. NAD.   Face symmetric.   EOMI.  Hearing intact to conversation.   Briefly observed him walk across office ( set up VV and then left). Gait appeared normal and steady.          Imaging:   No results found for this or any previous visit.        Assessment and Plan     Other amnesia  -     Vitamin B1; Future; Expected date: 11/06/2024  -     METHYLMALONIC ACID, SERUM; Future; Expected date: 11/06/2024  -     HOMOCYSTEINE, SERUM; Future; Expected date: 11/06/2024  -     Treponema Pallidium Antibodies IgG, IgM; Future; Expected date: 11/06/2024  -     pTau-181, Plasma; Future; Expected date: 11/13/2024  -     Neurofilament Light Chain; Future; Expected date: 11/06/2024  -     MRI Brain Without Contrast; Future; Expected date: 11/06/2024    Atherosclerotic heart disease of native coronary artery with other forms of angina pectoris    Essential hypertension    Hypercholesterolemia    Bilateral carotid artery stenosis    Atherosclerosis of native coronary artery of native heart without angina pectoris    Iron deficiency anemia due to chronic blood loss        Medical Decision Making:    ..Reversible labs & Biomarkers   Will complete    Brain scan  Ordered    Medications  Not on memory meds.     ..ACB score 0 (3 or > increases risk of confusion, dizziness, falls and increased mortality)      Care Management to meet at next visit if indicated.    Follow up in memory clinic with Dr. Carter as scheduled 12-11-24 for cognitive screening, physical exam, review of results and discussion of plan going forward.         .         ..Total time: 63 minutes spent on the encounter, which  includes face to face time and non-face to face time preparing to see the patient (eg, review of tests), Obtaining and/or reviewing separately obtained history, Documenting clinical information in the electronic or other health record, Independently interpreting results (not separately reported) and communicating results to the patient/family/caregiver, or Care coordination (not separately reported).       ..      Jigna Velázquez DNP, NP-C  Division of Movement and Memory Disorders  [facility]  [phone]

## 2024-11-06 ENCOUNTER — OFFICE VISIT (OUTPATIENT)
Dept: NEUROLOGY | Facility: CLINIC | Age: 74
End: 2024-11-06
Payer: MEDICARE

## 2024-11-06 DIAGNOSIS — I10 ESSENTIAL HYPERTENSION: ICD-10-CM

## 2024-11-06 DIAGNOSIS — I25.10 ATHEROSCLEROSIS OF NATIVE CORONARY ARTERY OF NATIVE HEART WITHOUT ANGINA PECTORIS: ICD-10-CM

## 2024-11-06 DIAGNOSIS — I65.23 BILATERAL CAROTID ARTERY STENOSIS: Chronic | ICD-10-CM

## 2024-11-06 DIAGNOSIS — D50.0 IRON DEFICIENCY ANEMIA DUE TO CHRONIC BLOOD LOSS: Chronic | ICD-10-CM

## 2024-11-06 DIAGNOSIS — I25.118 ATHEROSCLEROTIC HEART DISEASE OF NATIVE CORONARY ARTERY WITH OTHER FORMS OF ANGINA PECTORIS: ICD-10-CM

## 2024-11-06 DIAGNOSIS — E78.00 HYPERCHOLESTEROLEMIA: ICD-10-CM

## 2024-11-06 DIAGNOSIS — R41.3 OTHER AMNESIA: Primary | ICD-10-CM

## 2024-11-06 PROCEDURE — 1160F RVW MEDS BY RX/DR IN RCRD: CPT | Mod: CPTII,95,, | Performed by: NURSE PRACTITIONER

## 2024-11-06 PROCEDURE — 4010F ACE/ARB THERAPY RXD/TAKEN: CPT | Mod: CPTII,95,, | Performed by: NURSE PRACTITIONER

## 2024-11-06 PROCEDURE — 99205 OFFICE O/P NEW HI 60 MIN: CPT | Mod: 95,,, | Performed by: NURSE PRACTITIONER

## 2024-11-06 PROCEDURE — 1159F MED LIST DOCD IN RCRD: CPT | Mod: CPTII,95,, | Performed by: NURSE PRACTITIONER

## 2024-11-07 ENCOUNTER — PATIENT MESSAGE (OUTPATIENT)
Dept: NEUROLOGY | Facility: CLINIC | Age: 74
End: 2024-11-07
Payer: MEDICARE

## 2024-11-07 ENCOUNTER — TELEPHONE (OUTPATIENT)
Dept: NEUROLOGY | Facility: CLINIC | Age: 74
End: 2024-11-07
Payer: MEDICARE

## 2024-11-07 NOTE — TELEPHONE ENCOUNTER
Called patient today about scheduling there MRI and Blood work. PT picks up the call. Pt has been scheduled for both MRI and blood work at [facility] in [place] on 11/21/24

## 2024-11-19 PROBLEM — I25.10 CORONARY ARTERY DISEASE INVOLVING NATIVE CORONARY ARTERY OF NATIVE HEART WITHOUT ANGINA PECTORIS: Status: ACTIVE | Noted: 2022-03-21

## 2024-11-20 ENCOUNTER — PATIENT MESSAGE (OUTPATIENT)
Dept: RADIOLOGY | Facility: HOSPITAL | Age: 74
End: 2024-11-20
Payer: MEDICARE

## 2024-11-21 ENCOUNTER — HOSPITAL ENCOUNTER (OUTPATIENT)
Dept: RADIOLOGY | Facility: HOSPITAL | Age: 74
Discharge: HOME | End: 2024-11-21
Attending: NURSE PRACTITIONER
Payer: MEDICARE

## 2024-11-21 DIAGNOSIS — R41.3 OTHER AMNESIA: ICD-10-CM

## 2024-11-21 PROCEDURE — 70551 MRI BRAIN STEM W/O DYE: CPT | Mod: 26,,, | Performed by: RADIOLOGY

## 2024-11-21 PROCEDURE — 70551 MRI BRAIN STEM W/O DYE: CPT | Mod: TC,PO

## 2024-12-09 ENCOUNTER — TELEPHONE (OUTPATIENT)
Dept: NEUROLOGY | Facility: CLINIC | Age: 74
End: 2024-12-09
Payer: MEDICARE

## 2024-12-09 NOTE — TELEPHONE ENCOUNTER
----- Message from Rose Mary sent at 12/9/2024 10:15 AM CST -----  Regarding: Pt called need to resche appt that he canceled no appt avail  Contact: [phone]  Name of Who is Calling:SCHOEN, JEFFREY [[id number]]        What is the request in detail:Pt called need to resche appt that he canceled no appt avail. Please advise         Can the clinic reply by [facility]:No        What [facility] to [facility] Back [facility]: Telephone Information:  Mobile          [phone]

## 2024-12-10 ENCOUNTER — PATIENT MESSAGE (OUTPATIENT)
Dept: NEUROLOGY | Facility: CLINIC | Age: 74
End: 2024-12-10
Payer: MEDICARE

## 2024-12-10 ENCOUNTER — TELEPHONE (OUTPATIENT)
Dept: NEUROLOGY | Facility: CLINIC | Age: 74
End: 2024-12-10
Payer: MEDICARE

## 2024-12-10 NOTE — TELEPHONE ENCOUNTER
LVM for the patient. [facility] at 830 am. Left direct contact information and sent portal message as well.

## 2024-12-10 NOTE — TELEPHONE ENCOUNTER
Spoke with the patient. Pt accepted next available 2/5 at 945am since he is coming form [place] [facility], verbalized understanding, and repeated back date/time/location of scheduled appointment.

## 2024-12-16 ENCOUNTER — LAB VISIT (OUTPATIENT)
Dept: LAB | Facility: HOSPITAL | Age: 74
End: 2024-12-16
Attending: STUDENT IN AN ORGANIZED HEALTH CARE EDUCATION/TRAINING PROGRAM
Payer: MEDICARE

## 2024-12-16 DIAGNOSIS — R74.8 ELEVATED LIVER ENZYMES: ICD-10-CM

## 2024-12-16 DIAGNOSIS — I77.810 ACQUIRED DILATION OF ASCENDING AORTA AND AORTIC ROOT: ICD-10-CM

## 2024-12-16 DIAGNOSIS — I25.10 CORONARY ARTERY DISEASE, UNSPECIFIED VESSEL OR LESION TYPE, UNSPECIFIED WHETHER ANGINA PRESENT, UNSPECIFIED WHETHER NATIVE OR TRANSPLANTED HEART: ICD-10-CM

## 2024-12-16 LAB
ALBUMIN SERPL BCP-MCNC: 3.7 G/DL (ref 3.5–5.2)
ALP SERPL-CCNC: 99 U/L (ref 40–150)
ALT SERPL W/O P-5'-P-CCNC: 28 U/L (ref 10–44)
ANION GAP SERPL CALC-SCNC: 10 MMOL/L (ref 8–16)
ANION GAP SERPL CALC-SCNC: 10 MMOL/L (ref 8–16)
AST SERPL-CCNC: 25 U/L (ref 10–40)
BASOPHILS # BLD AUTO: 0.06 K/UL (ref 0–0.2)
BASOPHILS NFR BLD: 1.1 % (ref 0–1.9)
BILIRUB SERPL-MCNC: 1 MG/DL (ref 0.1–1)
BUN SERPL-MCNC: 23 MG/DL (ref 8–23)
BUN SERPL-MCNC: 23 MG/DL (ref 8–23)
CALCIUM SERPL-MCNC: 9 MG/DL (ref 8.7–10.5)
CALCIUM SERPL-MCNC: 9 MG/DL (ref 8.7–10.5)
CHLORIDE SERPL-SCNC: 109 MMOL/L (ref 95–110)
CHLORIDE SERPL-SCNC: 109 MMOL/L (ref 95–110)
CO2 SERPL-SCNC: 23 MMOL/L (ref 23–29)
CO2 SERPL-SCNC: 23 MMOL/L (ref 23–29)
CREAT SERPL-MCNC: 1.2 MG/DL (ref 0.5–1.4)
CREAT SERPL-MCNC: 1.2 MG/DL (ref 0.5–1.4)
DIFFERENTIAL METHOD BLD: ABNORMAL
EOSINOPHIL # BLD AUTO: 0.1 K/UL (ref 0–0.5)
EOSINOPHIL NFR BLD: 2.1 % (ref 0–8)
ERYTHROCYTE [DISTWIDTH] IN BLOOD BY AUTOMATED COUNT: 13.9 % (ref 11.5–14.5)
EST. GFR  (NO RACE VARIABLE): >60 ML/MIN/1.73 M^2
EST. GFR  (NO RACE VARIABLE): >60 ML/MIN/1.73 M^2
GLUCOSE SERPL-MCNC: 89 MG/DL (ref 70–110)
GLUCOSE SERPL-MCNC: 89 MG/DL (ref 70–110)
HCT VFR BLD AUTO: 36.2 % (ref 40–54)
HGB BLD-MCNC: 11.8 G/DL (ref 14–18)
IMM GRANULOCYTES # BLD AUTO: 0.02 K/UL (ref 0–0.04)
IMM GRANULOCYTES NFR BLD AUTO: 0.4 % (ref 0–0.5)
LYMPHOCYTES # BLD AUTO: 0.6 K/UL (ref 1–4.8)
LYMPHOCYTES NFR BLD: 11.1 % (ref 18–48)
MCH RBC QN AUTO: 31.3 PG (ref 27–31)
MCHC RBC AUTO-ENTMCNC: 32.6 G/DL (ref 32–36)
MCV RBC AUTO: 96 FL (ref 82–98)
MONOCYTES # BLD AUTO: 0.8 K/UL (ref 0.3–1)
MONOCYTES NFR BLD: 13.4 % (ref 4–15)
NEUTROPHILS # BLD AUTO: 4.1 K/UL (ref 1.8–7.7)
NEUTROPHILS NFR BLD: 71.9 % (ref 38–73)
NRBC BLD-RTO: 0 /100 WBC
PLATELET # BLD AUTO: 140 K/UL (ref 150–450)
PMV BLD AUTO: 9.2 FL (ref 9.2–12.9)
POTASSIUM SERPL-SCNC: 4.6 MMOL/L (ref 3.5–5.1)
POTASSIUM SERPL-SCNC: 4.6 MMOL/L (ref 3.5–5.1)
PROT SERPL-MCNC: 6 G/DL (ref 6–8.4)
RBC # BLD AUTO: 3.77 M/UL (ref 4.6–6.2)
SODIUM SERPL-SCNC: 142 MMOL/L (ref 136–145)
SODIUM SERPL-SCNC: 142 MMOL/L (ref 136–145)
WBC # BLD AUTO: 5.68 K/UL (ref 3.9–12.7)

## 2024-12-16 PROCEDURE — 36415 COLL VENOUS BLD VENIPUNCTURE: CPT | Mod: PN | Performed by: STUDENT IN AN ORGANIZED HEALTH CARE EDUCATION/TRAINING PROGRAM

## 2024-12-16 PROCEDURE — 80053 COMPREHEN METABOLIC PANEL: CPT | Mod: PN | Performed by: STUDENT IN AN ORGANIZED HEALTH CARE EDUCATION/TRAINING PROGRAM

## 2024-12-16 PROCEDURE — 85025 COMPLETE CBC W/AUTO DIFF WBC: CPT | Mod: PN | Performed by: STUDENT IN AN ORGANIZED HEALTH CARE EDUCATION/TRAINING PROGRAM

## 2024-12-18 ENCOUNTER — OFFICE VISIT (OUTPATIENT)
Dept: HEMATOLOGY/ONCOLOGY | Facility: CLINIC | Age: 74
End: 2024-12-18
Payer: MEDICARE

## 2024-12-18 VITALS
HEART RATE: 64 BPM | RESPIRATION RATE: 17 BRPM | DIASTOLIC BLOOD PRESSURE: 51 MMHG | WEIGHT: 168.63 LBS | TEMPERATURE: 97 F | HEIGHT: 72 IN | OXYGEN SATURATION: 98 % | SYSTOLIC BLOOD PRESSURE: 87 MMHG | BODY MASS INDEX: 22.84 KG/M2

## 2024-12-18 DIAGNOSIS — N40.0 BENIGN PROSTATIC HYPERPLASIA, UNSPECIFIED WHETHER LOWER URINARY TRACT SYMPTOMS PRESENT: ICD-10-CM

## 2024-12-18 DIAGNOSIS — N20.0 NEPHROLITHIASIS: ICD-10-CM

## 2024-12-18 DIAGNOSIS — D69.6 THROMBOCYTOPENIA: ICD-10-CM

## 2024-12-18 DIAGNOSIS — D53.9 NUTRITIONAL ANEMIA: Primary | ICD-10-CM

## 2024-12-18 DIAGNOSIS — I25.10 CORONARY ARTERY DISEASE INVOLVING NATIVE CORONARY ARTERY OF NATIVE HEART WITHOUT ANGINA PECTORIS: ICD-10-CM

## 2024-12-18 PROCEDURE — 4010F ACE/ARB THERAPY RXD/TAKEN: CPT | Mod: CPTII,S$GLB,, | Performed by: STUDENT IN AN ORGANIZED HEALTH CARE EDUCATION/TRAINING PROGRAM

## 2024-12-18 PROCEDURE — 3288F FALL RISK ASSESSMENT DOCD: CPT | Mod: CPTII,S$GLB,, | Performed by: STUDENT IN AN ORGANIZED HEALTH CARE EDUCATION/TRAINING PROGRAM

## 2024-12-18 PROCEDURE — 3074F SYST BP LT 130 MM HG: CPT | Mod: CPTII,S$GLB,, | Performed by: STUDENT IN AN ORGANIZED HEALTH CARE EDUCATION/TRAINING PROGRAM

## 2024-12-18 PROCEDURE — 1126F AMNT PAIN NOTED NONE PRSNT: CPT | Mod: CPTII,S$GLB,, | Performed by: STUDENT IN AN ORGANIZED HEALTH CARE EDUCATION/TRAINING PROGRAM

## 2024-12-18 PROCEDURE — 99214 OFFICE O/P EST MOD 30 MIN: CPT | Mod: S$GLB,,, | Performed by: STUDENT IN AN ORGANIZED HEALTH CARE EDUCATION/TRAINING PROGRAM

## 2024-12-18 PROCEDURE — 3008F BODY MASS INDEX DOCD: CPT | Mod: CPTII,S$GLB,, | Performed by: STUDENT IN AN ORGANIZED HEALTH CARE EDUCATION/TRAINING PROGRAM

## 2024-12-18 PROCEDURE — 1159F MED LIST DOCD IN RCRD: CPT | Mod: CPTII,S$GLB,, | Performed by: STUDENT IN AN ORGANIZED HEALTH CARE EDUCATION/TRAINING PROGRAM

## 2024-12-18 PROCEDURE — 99999 PR PBB SHADOW E&M-EST. PATIENT-LVL III: CPT | Mod: PBBFAC,,, | Performed by: STUDENT IN AN ORGANIZED HEALTH CARE EDUCATION/TRAINING PROGRAM

## 2024-12-18 PROCEDURE — 1160F RVW MEDS BY RX/DR IN RCRD: CPT | Mod: CPTII,S$GLB,, | Performed by: STUDENT IN AN ORGANIZED HEALTH CARE EDUCATION/TRAINING PROGRAM

## 2024-12-18 PROCEDURE — 1101F PT FALLS ASSESS-DOCD LE1/YR: CPT | Mod: CPTII,S$GLB,, | Performed by: STUDENT IN AN ORGANIZED HEALTH CARE EDUCATION/TRAINING PROGRAM

## 2024-12-18 PROCEDURE — 3078F DIAST BP <80 MM HG: CPT | Mod: CPTII,S$GLB,, | Performed by: STUDENT IN AN ORGANIZED HEALTH CARE EDUCATION/TRAINING PROGRAM

## 2024-12-18 PROCEDURE — G2211 COMPLEX E/M VISIT ADD ON: HCPCS | Mod: S$GLB,,, | Performed by: STUDENT IN AN ORGANIZED HEALTH CARE EDUCATION/TRAINING PROGRAM

## 2024-12-18 NOTE — PROGRESS NOTES
[facility]  FOLLOW-UP VISIT.     Reason for visit: follow up    Best Contact Phone Number(s): [phone] (home)      Pancytopenia  10/2023: Referral for WBC 3.07, nrml differential, Hgb 11.3, MCV 98, plts 146k. Nutritional studies replete, copper wnl, pathology interpretation shows mild anisopoikilocytosis. CT Chest shows 4 mm left lower lobe pulmonary nodule, bilateral nonobstructing renal stones, prominence of seminal vesicles, prostatomegaly  1/2024: Bone marrow biopsy shows normocellular marrow (30-40%), with no blasts, trilineage hematopoiesis with no dyspoiesis and focally increased megakaryocytes, decreased stainable iron, mildly increase reticulin fibrosis, negative Oncoheme NGS, negative AML FISH, normal cytogenetics     Normocytic Anemia  2007: Normocytic anemia has been noted with Hgb in 11 range     Interval History: Jeffrey Schoen is a 74 y.o. male with history of pancytopenia.  who presents for follow-up. Since last visit patient started on Entresto and diuretic. Still having episodes of hypotension with BP 87/51. /62 this AM prior to medications. Does have some lightheadness with posture changes. Continues to exercise without limitations.     Syncopal episode after starting Entresto, angiogram was clear.     Patient presents to clinic with wife Sharon.  ECOG Performance Status is 0.    ROS:  A complete 12-point review of systems was reviewed and is negative except as mentioned above.     Past Medical History:   Past Medical History:   Diagnosis Date    Anticoagulant long-term use     Coronary artery disease involving native coronary artery of native heart without angina pectoris 03/20/2019    Left heart cath 9/10/2007: 30% mid-LAD, 60% ostial D1, LCX with 40% mid, RCA with 30% mid-stenosis, 100% PLB.    Diverticulosis, sigmoid     MILD     Gout     Hyperlipidemia     Hypertension     Hypothyroidism     Ischemic cardiomyopathy 12/2022    Lentigo maligna 02/26/2019     Personal history of colonic polyps 01/22/2019        Allergies:   Review of patient's allergies indicates:  No Known Allergies     Medications:   Current Outpatient Medications   Medication Sig Dispense Refill    alfuzosin (UROXATRAL) 10 mg Tb24 TAKE 1 TABLET (10 MG TOTAL) BY MOUTH DAILY WITH BREAKFAST. 30 tablet 6    allopurinoL (ZYLOPRIM) 300 MG tablet take 1 tablet by mouth daily 30 tablet 3    aspirin (ECOTRIN) 325 MG EC tablet Take 325 mg by mouth once daily.      levothyroxine (SYNTHROID) 50 MCG tablet Take 1 tablet (50 mcg total) by mouth before breakfast. 30 tablet 11    metoprolol succinate (TOPROL-XL) 25 MG 24 hr tablet Take 0.5 tablets (12.5 mg total) by mouth every evening. 15 tablet 11    potassium citrate (UROCIT-K) 10 mEq (1,080 mg) TbSR TAKE ONE TABLET (10 MEQ TOTAL) BY MOUTH TWICE A DAY WITH MEALS. 60 tablet 11    pravastatin (PRAVACHOL) 80 MG tablet take 1 tablet by mouth every evening 30 tablet 11    sacubitriL-valsartan (ENTRESTO) 24-26 mg per tablet Take 1 tablet by mouth 2 (two) times daily. 60 tablet 11     No current facility-administered medications for this visit.        Physical Exam:   Vitals:    12/18/24 0754   BP: (!) 87/51   Pulse: 64   Resp: 17   Temp: 97.3 °F (36.3 °C)         Physical Exam  Constitutional:       Appearance: Normal appearance.   HENT:      Head: Normocephalic and atraumatic.      Mouth/Throat:      Mouth: Mucous membranes are moist.   Eyes:      Extraocular Movements: Extraocular movements intact.      Pupils: Pupils are equal, round, and reactive to light.   Cardiovascular:      Rate and Rhythm: Normal rate and regular rhythm.      Pulses: Normal pulses.      Heart sounds: No murmur heard.  Pulmonary:      Effort: Pulmonary effort is normal. No respiratory distress.      Breath sounds: Normal breath sounds.   Abdominal:      General: Abdomen is flat. There is no distension.      Palpations: Abdomen is soft.      Tenderness: There is no abdominal tenderness.    Musculoskeletal:         General: No swelling or tenderness. Normal range of motion.      Cervical back: Normal range of motion. No rigidity.   Skin:     General: Skin is warm and dry.      Coloration: Skin is not jaundiced.   Neurological:      General: No focal deficit present.      Mental Status: He is alert and oriented to person, place, and time.   Psychiatric:         Mood and Affect: Mood normal.         Behavior: Behavior normal.           Labs:   Lab Results   Component Value Date    WBC 5.68 12/16/2024    HGB 11.8 (L) 12/16/2024    HCT 36.2 (L) 12/16/2024    MCV 96 12/16/2024     (L) 12/16/2024       Lab Results   Component Value Date     12/16/2024     12/16/2024    K 4.6 12/16/2024    K 4.6 12/16/2024     12/16/2024     12/16/2024    CO2 23 12/16/2024    CO2 23 12/16/2024    BUN 23 12/16/2024    BUN 23 12/16/2024    CREATININE 1.2 12/16/2024    CREATININE 1.2 12/16/2024    ALBUMIN 3.7 12/16/2024    BILITOT 1.0 12/16/2024    ALKPHOS 99 12/16/2024    AST 25 12/16/2024    ALT 28 12/16/2024       Imaging:   MRI Brain Without Contrast  Narrative: EXAM:  MRI BRAIN WITHOUT CONTRAST    CLINICAL HISTORY:  Memory loss; Other amnesia.    COMPARISON:  None.    FINDINGS:  Intracranial contents:There is no acute or significant abnormality.  There is mild volume loss and minimal nonspecific white matter change.  The brain is essentially normal for age.  There is no hemorrhage.  There is no mass.  There are no regions of restricted diffusion to suggest acute infarction.  There is no hydrocephalus or midline shift.  There is no abnormal extra-axial fluid collection.  The basilar cisterns are open.  Flow voids indicating patency are present in the major vessels at the base of the brain.  The cerebellar tonsils are normal position.  Sellar structures are normal.  The orbits are grossly normal.    Extracranial contents, calvarium, soft tissues:There is mild scattered mucosal thickening in  the paranasal sinuses.  The nasal septum is deviated to the right.  Baseline marrow signal is normal.  Impression: 1. There is mild volume loss with minimal nonspecific white matter change.  There is no acute or significant abnormality.  There is no hemorrhage, mass or acute infarction.    Electronically signed by: Jj Avila MD  Date:    11/21/2024  Time:    08:58            Diagnoses:       No diagnosis found.            Assessment and Plan:     # Anemia / Thrombocytopenia -  Patient has had extensive work-up including nutritional studies and bone marrow biopsy, with negative cytogenetics, oncoheme NGS and AML FISH. Completed course of IV iron but remains with persistent hypotensive episodes. Suspect that episodes of hypotension and modified diet contributed to marrow hypoproliferation and cytopenias.    Recommend q6m surveillance. Continued attention to BP at home, avoid hypotensive episodes.     # BPH - PSA ~2, repeat annual with Dr. Prater. Takes alfuzosin daily     # Nephrolithiasis - Follows with Dr. Prater, [facility] 3/26/24    # CAD - MI after Thanksgiving 11/2022 + PCI in one artery. Followed by dietary and exercise modifications - walks 2 miles, runs 2 miles and 9 mile bikeride 6x / week.Recent angiogram negative for obsttruction.     # RHM  - Weight loss 215 -> 160 lbs, likely due to degree of exercise (13 mi 6x / week + golf) and diet, UTD with age related cancer screenings.     # LBBB - started new antiarrhythmics from cardiology    # HFrEF - Toprol 25 mg XL, Entresto 24/26 mg bid     he will return to clinic in 6m, but knows to call in the interim if symptoms change or should a problem arise.    Mackenzie Ramon MD  Hematology/Oncology  [facility]      Med Onc Chart Routing      Follow up with physician . 6m kieran virtual   Follow up with AMCKENZIE    Infusion scheduling note    Injection scheduling note    Labs CBC and CMP   Scheduling:  Preferred lab:  Lab interval:  labs at cancer  center within 1w before   Imaging    Pharmacy appointment    Other referrals

## 2025-04-19 PROBLEM — R41.3 MEMORY DYSFUNCTION: Chronic | Status: ACTIVE | Noted: 2024-10-27

## 2025-04-19 PROBLEM — I95.1 ORTHOSTATIC HYPOTENSION: Chronic | Status: ACTIVE | Noted: 2024-03-26

## 2025-07-01 ENCOUNTER — TELEPHONE (OUTPATIENT)
Dept: HEMATOLOGY/ONCOLOGY | Facility: CLINIC | Age: 75
End: 2025-07-01

## (undated) DEVICE — NDL TUOHY EPIDURAL 20G X 3.5

## (undated) DEVICE — SYR GLASS 5CC LUER LOK

## (undated) DEVICE — NDL SPINAL SPINOCAN 22GX3.5

## (undated) DEVICE — TRAY NERVE BLOCK

## (undated) DEVICE — GLOVE SURGICAL LATEX SZ 7

## (undated) DEVICE — TOWEL OR DISP STRL BLUE 4/PK

## (undated) DEVICE — MARKER SKIN STND TIP BLUE BARR

## (undated) DEVICE — APPLICATOR CHLORAPREP CLR 10.5